# Patient Record
Sex: FEMALE | Race: OTHER | Employment: FULL TIME | URBAN - METROPOLITAN AREA
[De-identification: names, ages, dates, MRNs, and addresses within clinical notes are randomized per-mention and may not be internally consistent; named-entity substitution may affect disease eponyms.]

---

## 2017-04-03 RX ORDER — FLUCONAZOLE 150 MG/1
150 TABLET ORAL ONCE
Qty: 2 TABLET | Refills: 0 | Status: SHIPPED | OUTPATIENT
Start: 2017-04-03 | End: 2017-04-03

## 2017-10-09 ENCOUNTER — OFFICE VISIT (OUTPATIENT)
Dept: OBGYN CLINIC | Age: 42
End: 2017-10-09
Payer: COMMERCIAL

## 2017-10-09 VITALS
HEIGHT: 66 IN | DIASTOLIC BLOOD PRESSURE: 70 MMHG | SYSTOLIC BLOOD PRESSURE: 116 MMHG | WEIGHT: 158 LBS | BODY MASS INDEX: 25.39 KG/M2 | RESPIRATION RATE: 18 BRPM | HEART RATE: 74 BPM

## 2017-10-09 DIAGNOSIS — Z01.419 WELL FEMALE EXAM WITH ROUTINE GYNECOLOGICAL EXAM: Primary | ICD-10-CM

## 2017-10-09 PROCEDURE — 99396 PREV VISIT EST AGE 40-64: CPT | Performed by: ADVANCED PRACTICE MIDWIFE

## 2017-10-09 ASSESSMENT — PATIENT HEALTH QUESTIONNAIRE - PHQ9
2. FEELING DOWN, DEPRESSED OR HOPELESS: 0
SUM OF ALL RESPONSES TO PHQ9 QUESTIONS 1 & 2: 0
1. LITTLE INTEREST OR PLEASURE IN DOING THINGS: 0
SUM OF ALL RESPONSES TO PHQ QUESTIONS 1-9: 0

## 2017-10-09 NOTE — PROGRESS NOTES
History and Physical  [address], [address] [facility], [address], [address] [facility] [phone]   Fax [phone]  Shruthi Dailey  10/9/2017              43 y.o. Chief Complaint   Patient presents with    Annual Exam       Patient's last menstrual period was 2017 (approximate). Primary Care Physician: Andres Walker MD    The patient was seen and examined. She has no chief complaint today and is here for her annual exam.  Her bowels are regular. There are no voiding complaints. She denies any bloating. She denies vaginal discharge and was counseled on STD's and the need for barrier contraception.      HPI : Shruthi Dailey is a 43 y.o. female     Gyn exam no complaints  ________________________________________________________________________  Obstetric History       T1      L1     SAB0   TAB0   Ectopic0   Molar0   Multiple0   Live Births1       # Outcome Date GA Lbr Mansoor/2nd Weight Sex Delivery Anes PTL Lv   1 Term 10/03/14 39w3d  7 lb 3 oz (3.26 kg) F Vag-Spont EPI  OBED        Past Medical History:   Diagnosis Date    Migraine     Narcolepsy                                                                    Past Surgical History:   Procedure Laterality Date    BREAST BIOPSY Left 2016     Family History   Problem Relation Age of Onset    Heart Disease Father      a fib    High Cholesterol Father     Hypertension Father     High Cholesterol Mother     Diabetes Paternal Grandmother     Brain Cancer Maternal Grandmother     Diabetes Paternal Aunt     Liver Cancer Paternal Aunt     Breast Cancer Neg Hx     Cancer Neg Hx     Colon Cancer Neg Hx     Eclampsia Neg Hx     Ovarian Cancer Neg Hx      Labor Neg Hx     Spont Abortions Neg Hx     Stroke Neg Hx      Social History     Social History    Marital status:      Spouse name: N/A    Number of children: N/A    Years of education: N/A     Occupational History    Not on file. Social History Main Topics    Smoking status: Never Smoker    Smokeless tobacco: Never Used    Alcohol use No    Drug use: No    Sexual activity: Yes     Partners: Male     Other Topics Concern    Not on file     Social History Narrative    No narrative on file       MEDICATIONS:  Current Outpatient Prescriptions   Medication Sig Dispense Refill    SUMAtriptan (IMITREX) 50 MG tablet       modafinil (PROVIGIL) 200 MG tablet       sodium oxybate (XYREM) 500 MG/ML SOLN solution Take 4.5 g by mouth 2 times daily.  clotrimazole-betamethasone (LOTRISONE) 1-0.05 % cream Apply topically 2 times daily. 1 Tube 0     No current facility-administered medications for this visit. ALLERGIES:  Allergies as of 10/09/2017    (No Known Allergies)       Symptoms of decreased mood absent    **If either question is answered in a  positive fashion then complete the PHQ9 Scoring Evaluation and make the appropriate referral**      Immunization status: up to date and documented, stated as current, but no records available. Gynecologic History:  Menarche: 15 yo  Menopause at  yo     Patient's last menstrual period was 09/12/2017 (approximate). Sexually Active: Yes    STD History: No     Permanent Sterilization: No   Reversible Birth Control: No        Hormone Replacement Exposure: No      Genetic Qualified Family History of Breast, Ovarian , Colon or Uterine Cancer: See family history     If YES see scanned worksheet.     Preventative Health Testing:    Health Maintenance:  Health Maintenance Due   Topic Date Due    DTaP/Tdap/Td vaccine (1 - Tdap) 05/27/1994    Lipid screen  05/27/2015    Diabetes screen  05/27/2015    Cervical cancer screen  08/24/2016    Flu vaccine (1) 09/01/2017       Date of Last Pap Smear: 2015 neg/neg  Abnormal Pap Smear History: n/a  Colposcopy History:   Date of Last Mammogram: 2016 negative bx with marker placed  Date of Last Colonoscopy:   Date of evaluated and found to be appropriate for the patients age. OMM Structural Component:  The patient did not complain of a Chief complaint requiring OMM. Chief Complaint:none    Structural Exam: No Interest                  ASSESSMENT:      43 y.o. Annual  1. Well female exam with routine gynecological exam  BETTINA DIGITAL SCREEN W OR WO CAD BILATERAL          Chief Complaint   Patient presents with    Annual Exam          Past Medical History:   Diagnosis Date    Migraine     Narcolepsy          Patient Active Problem List    Diagnosis Date Noted    FH: Down syndrome 03/03/2014     Priority: High     FOB has cousins with down syndrome x 2      Narcolepsy      Priority: High     Pt takes xyrem and modafinal  Refer to [facility] for consult and first trimester screen      Migraine      Priority: Medium    Hx of abnormal Pap smear 03/26/2014     Priority: Low       Change 16 weeks U/S at [facility] to anatomy scan at 20 weeks            Hereditary Breast, Ovarian, Colon and Uterine Cancer screening Done. Tobacco & Secondary smoke risks reviewed; instructed on cessation and avoidance      Counseling Completed:  Preventative Health Recommendations and Follow up. The patient was informed of the recommended preventative health recommendations. 1. Annuals every year; Cytology collections per prevailing guidelines. 2. Mammograms begin every year at 37 yo if no abnormalities are found and no family     History. 3. Bone density studies every 2-3 years. Begin at 71 yo. If no fracture history or osteoporosis family history. (significant). 4. Colonoscopy begin at 49 yo. Repeat every ten years if negative and no family history. 5. Calcium of 7769-6009 mg/day in split dosing  6. Vitamin D 400-800 IU/day  7. All other preventative health recommendations will be managed by the patients Primary care physician.              PLAN:  Mammogram ordered  Repeat Annual every 1 year  Cervical Cytology Evaluation begins at 24

## 2017-10-09 NOTE — PROGRESS NOTES
Subjective:      Patient ID: Ulysses Dress is a 43 y.o. female.     HPI    Review of Systems    Objective:   Physical Exam    Assessment:      ***      Plan:      ***

## 2017-11-10 ENCOUNTER — HOSPITAL ENCOUNTER (OUTPATIENT)
Dept: WOMENS IMAGING | Age: 42
Discharge: HOME | End: 2017-11-10
Payer: COMMERCIAL

## 2017-11-10 DIAGNOSIS — Z01.419 WELL FEMALE EXAM WITH ROUTINE GYNECOLOGICAL EXAM: ICD-10-CM

## 2017-11-10 PROCEDURE — 77063 BREAST TOMOSYNTHESIS BI: CPT

## 2018-09-10 ENCOUNTER — HOSPITAL ENCOUNTER (OUTPATIENT)
Age: 43
Discharge: HOME | End: 2018-09-10
Payer: COMMERCIAL

## 2018-09-10 ENCOUNTER — INITIAL PRENATAL (OUTPATIENT)
Dept: OBGYN CLINIC | Age: 43
End: 2018-09-10

## 2018-09-10 ENCOUNTER — HOSPITAL ENCOUNTER (OUTPATIENT)
Age: 43
Setting detail: SPECIMEN
Discharge: HOME | End: 2018-09-10
Payer: COMMERCIAL

## 2018-09-10 VITALS
WEIGHT: 180 LBS | HEART RATE: 82 BPM | SYSTOLIC BLOOD PRESSURE: 114 MMHG | DIASTOLIC BLOOD PRESSURE: 70 MMHG | BODY MASS INDEX: 29.05 KG/M2

## 2018-09-10 DIAGNOSIS — Z34.90 SECOND PREGNANCY: Primary | ICD-10-CM

## 2018-09-10 DIAGNOSIS — Z3A.08 8 WEEKS GESTATION OF PREGNANCY: ICD-10-CM

## 2018-09-10 DIAGNOSIS — Z34.90 SECOND PREGNANCY: ICD-10-CM

## 2018-09-10 DIAGNOSIS — O09.521 ELDERLY MULTIGRAVIDA IN FIRST TRIMESTER: ICD-10-CM

## 2018-09-10 DIAGNOSIS — Z82.79 FH: DOWN SYNDROME: ICD-10-CM

## 2018-09-10 DIAGNOSIS — Z32.01 POSITIVE PREGNANCY TEST: ICD-10-CM

## 2018-09-10 LAB
ABO/RH: NORMAL
ABSOLUTE EOS #: 0.1 K/UL (ref 0–0.4)
ABSOLUTE IMMATURE GRANULOCYTE: ABNORMAL K/UL (ref 0–0.3)
ABSOLUTE LYMPH #: 1.3 K/UL (ref 1–4.8)
ABSOLUTE MONO #: 0.5 K/UL (ref 0.1–1.3)
ANTIBODY SCREEN: NEGATIVE
BASOPHILS # BLD: 1 % (ref 0–2)
BASOPHILS ABSOLUTE: 0 K/UL (ref 0–0.2)
BILIRUBIN URINE: NEGATIVE
BLOOD BANK COMMENT: NORMAL
COLOR: YELLOW
COMMENT UA: NORMAL
CONTROL: ABNORMAL
DIFFERENTIAL TYPE: ABNORMAL
EOSINOPHILS RELATIVE PERCENT: 1 % (ref 0–4)
GLUCOSE BLD-MCNC: 78 MG/DL (ref 70–99)
GLUCOSE URINE: NEGATIVE
HCG QUANTITATIVE: ABNORMAL IU/L
HCT VFR BLD CALC: 41.7 % (ref 36–46)
HEMOGLOBIN: 14 G/DL (ref 12–16)
HEPATITIS B SURFACE ANTIGEN: NONREACTIVE
HIV AG/AB: NONREACTIVE
IMMATURE GRANULOCYTES: ABNORMAL %
KETONES, URINE: NEGATIVE
LEUKOCYTE ESTERASE, URINE: NEGATIVE
LYMPHOCYTES # BLD: 17 % (ref 24–44)
MCH RBC QN AUTO: 31.1 PG (ref 26–34)
MCHC RBC AUTO-ENTMCNC: 33.6 G/DL (ref 31–37)
MCV RBC AUTO: 92.5 FL (ref 80–100)
MONOCYTES # BLD: 7 % (ref 1–7)
NITRITE, URINE: NEGATIVE
NRBC AUTOMATED: ABNORMAL PER 100 WBC
PDW BLD-RTO: 13 % (ref 11.5–14.9)
PH UA: 7 (ref 5–8)
PLATELET # BLD: 254 K/UL (ref 150–450)
PLATELET ESTIMATE: ABNORMAL
PMV BLD AUTO: 8.8 FL (ref 6–12)
PREGNANCY TEST URINE, POC: POSITIVE
PROTEIN UA: NEGATIVE
RBC # BLD: 4.51 M/UL (ref 4–5.2)
RBC # BLD: ABNORMAL 10*6/UL
RUBV IGG SER QL: 61 IU/ML
SEG NEUTROPHILS: 74 % (ref 36–66)
SEGMENTED NEUTROPHILS ABSOLUTE COUNT: 5.8 K/UL (ref 1.3–9.1)
SPECIFIC GRAVITY UA: 1.01 (ref 1–1.03)
T. PALLIDUM, IGG: NONREACTIVE
TSH SERPL DL<=0.05 MIU/L-ACNC: 1.98 MIU/L (ref 0.3–5)
TURBIDITY: CLEAR
URINE HGB: NEGATIVE
UROBILINOGEN, URINE: NORMAL
WBC # BLD: 7.8 K/UL (ref 3.5–11)
WBC # BLD: ABNORMAL 10*3/UL

## 2018-09-10 PROCEDURE — 87491 CHLMYD TRACH DNA AMP PROBE: CPT

## 2018-09-10 PROCEDURE — 84702 CHORIONIC GONADOTROPIN TEST: CPT

## 2018-09-10 PROCEDURE — 86901 BLOOD TYPING SEROLOGIC RH(D): CPT

## 2018-09-10 PROCEDURE — 87070 CULTURE OTHR SPECIMN AEROBIC: CPT

## 2018-09-10 PROCEDURE — 86780 TREPONEMA PALLIDUM: CPT

## 2018-09-10 PROCEDURE — 84443 ASSAY THYROID STIM HORMONE: CPT

## 2018-09-10 PROCEDURE — 82947 ASSAY GLUCOSE BLOOD QUANT: CPT

## 2018-09-10 PROCEDURE — 36415 COLL VENOUS BLD VENIPUNCTURE: CPT

## 2018-09-10 PROCEDURE — 87624 HPV HI-RISK TYP POOLED RSLT: CPT

## 2018-09-10 PROCEDURE — 81003 URINALYSIS AUTO W/O SCOPE: CPT

## 2018-09-10 PROCEDURE — 85025 COMPLETE CBC W/AUTO DIFF WBC: CPT

## 2018-09-10 PROCEDURE — 0500F INITIAL PRENATAL CARE VISIT: CPT | Performed by: ADVANCED PRACTICE MIDWIFE

## 2018-09-10 PROCEDURE — 86900 BLOOD TYPING SEROLOGIC ABO: CPT

## 2018-09-10 PROCEDURE — 87389 HIV-1 AG W/HIV-1&-2 AB AG IA: CPT

## 2018-09-10 PROCEDURE — 87591 N.GONORRHOEAE DNA AMP PROB: CPT

## 2018-09-10 PROCEDURE — 86850 RBC ANTIBODY SCREEN: CPT

## 2018-09-10 PROCEDURE — 86762 RUBELLA ANTIBODY: CPT

## 2018-09-10 PROCEDURE — 87340 HEPATITIS B SURFACE AG IA: CPT

## 2018-09-10 PROCEDURE — G0145 SCR C/V CYTO,THINLAYER,RESCR: HCPCS

## 2018-09-10 NOTE — PROGRESS NOTES
also discussed with morbidity rates in detail. She declined the procedure. Route of delivery and counseling on vaginal, operative vaginal, and  sections were completed with the risks of each to both the patient as well as her baby. The possibility of a blood transfusion was discussed as well. The patient was not opposed to receiving a transfusion if needed. Nuchal translucency/Quad Evaluation and MSAFP single marker testing was reviewed in detail with attention to timing of testing and their windows. For patients beyond the gestational age for Nuchal translucency evaluation Quad testing was recommended. Timing for the Quad test was reviewed. Benefits of the above testing was reviewed. A second trimester amniocentesis was also made available to the patient. Risks, Benefits and non-invasive alternative testing was reviewed. The literature regarding a questionable link to pitocin augmentation and induction of labor, the assistance of labor contractions and the initiation of contractions to help delivery, have been reviewed with the patient regarding the increased potential of having a  with Attention Deficit Hyperactivity Disorder and or Autism. These two disorders and the ramifications of their impact on a child and the family caring for that child has been reviewed with the patient in detail. She was given the risks, benefits and alternatives of the use of this medication. She has agreed to its use in the delivery of her unborn child if needed at the time of delivery, Yes. The patient was questioned in detail regarding any genetic misnomer history, chromosomal abnormalities, or learning disabilities in  herself, the father of the baby or their families. SHE DENIED ANY HISTORY AS STATED ABOVE: Yes    Upon completion of the visit all questions were answered and the patients follow-up and testing schedule were reviewed. Prenatal vitamins were given.     T-dap Vaccine recommendations reviewed with the patient. Patient notified of timing of vaccination 27-36 weeks gestation. Patient aware Vaccine is NOT Live. Yes.

## 2018-09-11 LAB
C TRACH DNA GENITAL QL NAA+PROBE: NEGATIVE
HPV SAMPLE: NORMAL
HPV SOURCE: NORMAL
HPV, GENOTYPE 16: NOT DETECTED
HPV, GENOTYPE 18: NOT DETECTED
HPV, HIGH RISK OTHER: NOT DETECTED
HPV, INTERPRETATION: NORMAL
N. GONORRHOEAE DNA: NEGATIVE

## 2018-09-13 LAB
CULTURE: NORMAL
CULTURE: NORMAL
Lab: NORMAL
SPECIMEN DESCRIPTION: NORMAL
STATUS: NORMAL

## 2018-09-25 LAB — CYTOLOGY REPORT: NORMAL

## 2018-10-04 ENCOUNTER — ROUTINE PRENATAL (OUTPATIENT)
Dept: PERINATAL CARE | Age: 43
End: 2018-10-04
Payer: COMMERCIAL

## 2018-10-04 VITALS
RESPIRATION RATE: 20 BRPM | SYSTOLIC BLOOD PRESSURE: 110 MMHG | TEMPERATURE: 98 F | HEART RATE: 80 BPM | WEIGHT: 188 LBS | DIASTOLIC BLOOD PRESSURE: 70 MMHG | BODY MASS INDEX: 30.22 KG/M2 | HEIGHT: 66 IN

## 2018-10-04 DIAGNOSIS — O09.521 ELDERLY MULTIGRAVIDA IN FIRST TRIMESTER: ICD-10-CM

## 2018-10-04 DIAGNOSIS — Z36.9 FIRST TRIMESTER SCREENING: Primary | ICD-10-CM

## 2018-10-04 DIAGNOSIS — Z3A.13 13 WEEKS GESTATION OF PREGNANCY: ICD-10-CM

## 2018-10-04 DIAGNOSIS — O36.80X0 ENCOUNTER TO DETERMINE FETAL VIABILITY OF PREGNANCY, SINGLE OR UNSPECIFIED FETUS: ICD-10-CM

## 2018-10-04 PROCEDURE — 76813 OB US NUCHAL MEAS 1 GEST: CPT | Performed by: OBSTETRICS & GYNECOLOGY

## 2018-10-04 PROCEDURE — 76801 OB US < 14 WKS SINGLE FETUS: CPT | Performed by: OBSTETRICS & GYNECOLOGY

## 2018-10-09 ENCOUNTER — ROUTINE PRENATAL (OUTPATIENT)
Dept: OBGYN CLINIC | Age: 43
End: 2018-10-09

## 2018-10-09 VITALS
SYSTOLIC BLOOD PRESSURE: 114 MMHG | HEART RATE: 84 BPM | WEIGHT: 191 LBS | BODY MASS INDEX: 30.83 KG/M2 | DIASTOLIC BLOOD PRESSURE: 76 MMHG

## 2018-10-09 DIAGNOSIS — Z82.79 FH: DOWN SYNDROME: ICD-10-CM

## 2018-10-09 DIAGNOSIS — O09.521 ELDERLY MULTIGRAVIDA IN FIRST TRIMESTER: ICD-10-CM

## 2018-10-09 DIAGNOSIS — Z3A.14 14 WEEKS GESTATION OF PREGNANCY: Primary | ICD-10-CM

## 2018-10-09 PROCEDURE — 0502F SUBSEQUENT PRENATAL CARE: CPT | Performed by: ADVANCED PRACTICE MIDWIFE

## 2018-10-09 NOTE — PROGRESS NOTES
Amador Hernandez is a 37 y.o. female 14w0d        OB History    Para Term  AB Living   2 1 1     1   SAB TAB Ectopic Molar Multiple Live Births             1      # Outcome Date GA Lbr Mansoor/2nd Weight Sex Delivery Anes PTL Lv   2 Current            1 Term 10/03/14 39w3d  7 lb 3 oz (3.26 kg) F Vag-Spont EPI  OBED                Vitals  BP: 114/76  Weight: 191 lb (86.6 kg)  Pulse: 84  Patient Position: Sitting  Albumin: Negative  Glucose: Negative      The patient was seen and evaluated. There was Positive fetal movements. No contractions or leakage of fluid. Signs and symptoms of  labor as well as labor were reviewed. The Nuchal Translucency testing was reviewed and found to be U/S normal, labs not resulted yet. . A single marker MSAFP will be ordered for a 15-20 week gestational age window. TOP ST OH Reviewed. Dates were reviewed with the patient. An 18-22 week anatomy ultrasound has been ordered. The patient will return to the office for her next visit in 4 weeks. See antepartum flow sheet. No Patient Care Coordination Note on file. Assessment:  1. Amador Hernandez is a 37 y.o. female  2.   3. 14w0d    Patient Active Problem List    Diagnosis Date Noted    Elderly multigravida in first trimester 2014     Priority: High     FOB 55 with family hx of cousins x 2 with down syndrome   Refer to [facility] for consult and first trimester screen  10/4/2018 Declined invasive prenatal testing, considering NIPT testing through [facility]    Advanced Maternal Age Counseling    If a woman is over the age of 28, she is considered to be of Advanced Maternal Age, and with this title comes risks for mom and baby.    Increased risk of Down Syndrome - the most common chromosomal birth defect (chromosomes - cells that carry genes and transmit heredity information)   Increased risk of miscarriage   20% increase at ages 28 to 44   35% increase at ages 42-38   Over 50% increase by age 45  Increased risk of  Section () for delivery method   Gestational diabetes - diabetes that develops for the first time during pregnancy. Women who have this could possibly have a very large baby who then is at risk for injuries during delivery. Pregnancy Induced Hypertension - High blood pressure   Placental Problems - one of the most common placental problems is placenta previa in which the placenta covers all or part of the uterine opening (cervix). This can cause severe bleeding during delivery and can make  delivery necessary. Even with all of these increased risks, with the advancement in medical procedures and testing, there are several ways to reduce your risk. They include early and regular prenatal care, taking the multivitamin with folic acid prescribed by your health care manager, beginning pregnancy at a healthy weight, not smoking or drinking alcoholic beverages, and eating healthy foods. There are tests that all pregnant women are encouraged to take, but there is additional prenatal testing that is regularly offered to any woman 28 or older because of the potential of increased risks to the mother and baby. These tests are chorionic villus sampling (CVS) and amniocentesis. [employer] is also available which is a non-invasive option for fetal karyotyping. With CVS, a small sample of cells (called chorionic villi) is taken from the placenta where it attached to the wall of the uterus. Chorionic villi are tiny parts of the placenta; therefore they have the same genes as the baby. This test is 98% accurate, but can carry a slightly higher risk of miscarriage than amniocentesis, since the procedure is done in early pregnancy. Amniocentesis is a procedure where a sample of fluid is removed from the amniotic sac for analysis and evaluation. During this procedure, fluid is removed by placing a long needle through the abdominal wall into the amniotic sac.  The amniocentesis needle is typically guided into the sac with the help of ultrasound imaging. Once the needle is in the sac, a syringe is used to withdraw the clear taryn-colored amniotic fluid, which looks a bit like urine. NIPT, utilizes the maternal blood to test for fetal cells. These fetal cells are then karyotyped for genetic evaluation. All of these tests, CVS, NIPT and amniocentesis, let couples know if the fetus will have genetic abnormalities, and will help them make informed decisions regarding their pregnancy. Karyotyping by either CVS, NIPT or Amniocentesis is the ONLY way to confirm the genetic chromosomal structure regarding trisomy; Down's Syndrome, Edward's or Pateau's. [facility] was reviewed. If NIPT is positive then a confirmatory amniocentesis would be recommended to confirm the diagnosis.  FH: Down syndrome 03/03/2014     Priority: High     FOB has cousins with down syndrome x 2      Narcolepsy      Priority: High     Pt takes xyrem and modafinal  Refer to [facility] for consult and first trimester screen      Migraine      Priority: Medium    Hx of abnormal Pap smear 03/26/2014     Priority: Low       Change 16 weeks U/S at [facility] to anatomy scan at 20 weeks          Diagnosis Orders   1. 14 weeks gestation of pregnancy     2. FH: Down syndrome     3.  Elderly multigravida in first trimester           Plan:

## 2018-10-23 ENCOUNTER — ROUTINE PRENATAL (OUTPATIENT)
Dept: PERINATAL CARE | Age: 43
End: 2018-10-23
Payer: COMMERCIAL

## 2018-10-23 VITALS
TEMPERATURE: 97.8 F | HEART RATE: 80 BPM | DIASTOLIC BLOOD PRESSURE: 60 MMHG | RESPIRATION RATE: 16 BRPM | BODY MASS INDEX: 30.53 KG/M2 | HEIGHT: 66 IN | WEIGHT: 190 LBS | SYSTOLIC BLOOD PRESSURE: 108 MMHG

## 2018-10-23 DIAGNOSIS — O09.522 ELDERLY MULTIGRAVIDA IN SECOND TRIMESTER: Primary | ICD-10-CM

## 2018-10-23 DIAGNOSIS — Z3A.16 16 WEEKS GESTATION OF PREGNANCY: ICD-10-CM

## 2018-10-23 DIAGNOSIS — O35.2XX0 HEREDITARY FAMILIAL DISEASE AFFECTING MANAGEMENT OF MOTHER AND POSSIBLY AFFECTING FETUS, ANTEPARTUM, SINGLE OR UNSPECIFIED FETUS: ICD-10-CM

## 2018-10-23 DIAGNOSIS — Z86.69 H/O MIGRAINE DURING PREGNANCY: ICD-10-CM

## 2018-10-23 DIAGNOSIS — Z87.59 H/O MIGRAINE DURING PREGNANCY: ICD-10-CM

## 2018-10-23 PROCEDURE — 99242 OFF/OP CONSLTJ NEW/EST SF 20: CPT | Performed by: OBSTETRICS & GYNECOLOGY

## 2018-11-06 ENCOUNTER — ROUTINE PRENATAL (OUTPATIENT)
Dept: OBGYN CLINIC | Age: 43
End: 2018-11-06

## 2018-11-06 ENCOUNTER — HOSPITAL ENCOUNTER (OUTPATIENT)
Age: 43
Discharge: HOME | End: 2018-11-06
Payer: COMMERCIAL

## 2018-11-06 VITALS
BODY MASS INDEX: 32.44 KG/M2 | DIASTOLIC BLOOD PRESSURE: 72 MMHG | HEART RATE: 76 BPM | SYSTOLIC BLOOD PRESSURE: 110 MMHG | WEIGHT: 201 LBS

## 2018-11-06 DIAGNOSIS — O09.521 ELDERLY MULTIGRAVIDA IN FIRST TRIMESTER: ICD-10-CM

## 2018-11-06 DIAGNOSIS — Z87.59 H/O MIGRAINE DURING PREGNANCY: ICD-10-CM

## 2018-11-06 DIAGNOSIS — O09.92 HIGH-RISK PREGNANCY IN SECOND TRIMESTER: ICD-10-CM

## 2018-11-06 DIAGNOSIS — O09.522 ELDERLY MULTIGRAVIDA IN SECOND TRIMESTER: ICD-10-CM

## 2018-11-06 DIAGNOSIS — O35.2XX0 HEREDITARY FAMILIAL DISEASE AFFECTING MANAGEMENT OF MOTHER AND POSSIBLY AFFECTING FETUS, ANTEPARTUM, SINGLE OR UNSPECIFIED FETUS: ICD-10-CM

## 2018-11-06 DIAGNOSIS — Z3A.18 18 WEEKS GESTATION OF PREGNANCY: ICD-10-CM

## 2018-11-06 DIAGNOSIS — Z86.69 H/O MIGRAINE DURING PREGNANCY: ICD-10-CM

## 2018-11-06 DIAGNOSIS — O09.92 HIGH-RISK PREGNANCY IN SECOND TRIMESTER: Primary | ICD-10-CM

## 2018-11-06 DIAGNOSIS — Z82.79 FH: DOWN SYNDROME: ICD-10-CM

## 2018-11-06 LAB
TOXOPLASM IGM: 0.45 INDEX
TOXOPLASMA BLOOD FOR RATIO: <0.5 IU/ML

## 2018-11-06 PROCEDURE — 86778 TOXOPLASMA ANTIBODY IGM: CPT

## 2018-11-06 PROCEDURE — 36415 COLL VENOUS BLD VENIPUNCTURE: CPT

## 2018-11-06 PROCEDURE — 0502F SUBSEQUENT PRENATAL CARE: CPT | Performed by: OBSTETRICS & GYNECOLOGY

## 2018-11-06 PROCEDURE — 82105 ALPHA-FETOPROTEIN SERUM: CPT

## 2018-11-06 PROCEDURE — 86777 TOXOPLASMA ANTIBODY: CPT

## 2018-11-06 NOTE — PROGRESS NOTES
patient was counseled on the risks of tobacco abuse. Both maternal and fetal. She was instructed to stop smoking if currently using tobacco. Morbidity, mortality, and cessation programs were reviewed. The risks include but are not limited to increased risks of  labor,  delivery, premature rupture of membranes, intrauterine growth restriction, intrauterine fetal demise and abruptio placenta. Secondary smoke risks were also reviewed. Increases in cancer, respiratory problems, and sudden infant death syndrome were reviewed as well. The patient was informed of a 2-4% risk of congenital anomalies in the general population. She was also informed that karyotyping is the only way to evaluate the fetus for genetic problems and genetic lethal anomalies. Chorionic villous sampling, amniocentesis and Maternal Genetic Blood Sampling-(NIPT Testing) were also discussed with morbidity rates in detail. She declined any of the options. Route of delivery and counseling on vaginal, operative vaginal, and  sections were completed with the risks of each to both the patient as well as her baby. The possibility of a blood transfusion was discussed as well. The patient was not opposed to receiving a transfusion if needed. Nuchal translucency and MSAFP single marker testing was reviewed in detail with attention to timing of testing and their windows. For patients beyond the gestational age for Nuchal translucency evaluation Quad testing was recommended. Timing for the Quad test was reviewed. Benefits of the above testing was reviewed. A second trimester amniocentesis was also made available to the patient. Risks, Benefits and non-invasive alternative testing was reviewed.      The literature regarding a questionable link to pitocin augmentation and induction of labor, the assistance of labor contractions and the initiation of contractions to help delivery, have been reviewed with the patient regarding the increased potential of having a  with Attention Deficit Hyperactivity Disorder and or Autism. These two disorders and the ramifications of their impact on a child and the family caring for that child has been reviewed with the patient in detail. She was given the risks, benefits and alternatives of the use of this medication. She has agreed to its use in the delivery of her unborn child if needed at the time of delivery, Yes. The patient was questioned in detail regarding any genetic misnomer history, chromosomal abnormalities, or learning disabilities in  herself, the father of the baby or their families. SHE DENIED ANY HISTORY AS STATED ABOVE: Yes    Upon completion of the visit all questions were answered and the patients follow-up and testing schedule were reviewed. Prenatal vitamins were given.

## 2018-11-08 LAB
AFP INTERPRETATION: NORMAL
AFP MOM: 1.14
AFP SPECIMEN: NORMAL
AFP: 40 NG/ML
DATE OF BIRTH: NORMAL
DATING METHOD: NORMAL
DETERMINED BY: NORMAL
DIABETIC: NO
DUE DATE: NORMAL
ESTIMATED DUE DATE: NORMAL
FAMILY HISTORY NTD: NO
GESTATIONAL AGE: NORMAL
INSULIN REQ DIABETES: NO
LAST MENSTRUAL PERIOD: NORMAL
MATERNAL AGE AT EDD: 43.9 YR
MATERNAL WEIGHT: 200
MONOCHORIONIC TWINS: NO
NUMBER OF FETUSES: NORMAL
PATIENT WEIGHT UNITS: NORMAL
PATIENT WEIGHT: NORMAL
RACE (MATERNAL): NORMAL
RACE: NORMAL
REPEAT SPECIMEN?: NO
SMOKING: NO
SMOKING: NO
VALPROIC/CARBAMAZEP: NO
ZZ NTE CLEAN UP: HISTORY: NO

## 2018-11-27 ENCOUNTER — ROUTINE PRENATAL (OUTPATIENT)
Dept: PERINATAL CARE | Age: 43
End: 2018-11-27
Payer: COMMERCIAL

## 2018-11-27 VITALS
WEIGHT: 206 LBS | SYSTOLIC BLOOD PRESSURE: 122 MMHG | HEART RATE: 87 BPM | DIASTOLIC BLOOD PRESSURE: 64 MMHG | BODY MASS INDEX: 33.11 KG/M2 | RESPIRATION RATE: 16 BRPM | HEIGHT: 66 IN | TEMPERATURE: 97.9 F

## 2018-11-27 DIAGNOSIS — Z36.86 ENCOUNTER FOR SCREENING FOR RISK OF PRE-TERM LABOR: ICD-10-CM

## 2018-11-27 DIAGNOSIS — O44.00 PLACENTA PREVIA WITHOUT HEMORRHAGE, ANTEPARTUM: ICD-10-CM

## 2018-11-27 DIAGNOSIS — O09.522 ELDERLY MULTIGRAVIDA IN SECOND TRIMESTER: Primary | ICD-10-CM

## 2018-11-27 DIAGNOSIS — O35.03X0 CHOROID PLEXUS CYST OF FETUS AFFECTING CARE OF MOTHER, ANTEPARTUM, SINGLE OR UNSPECIFIED FETUS: ICD-10-CM

## 2018-11-27 DIAGNOSIS — Z3A.21 21 WEEKS GESTATION OF PREGNANCY: ICD-10-CM

## 2018-11-27 DIAGNOSIS — G93.0 CHOROID PLEXUS CYST: Primary | ICD-10-CM

## 2018-11-27 PROCEDURE — 76811 OB US DETAILED SNGL FETUS: CPT | Performed by: OBSTETRICS & GYNECOLOGY

## 2018-11-27 PROCEDURE — 76817 TRANSVAGINAL US OBSTETRIC: CPT | Performed by: OBSTETRICS & GYNECOLOGY

## 2018-11-27 PROCEDURE — 99213 OFFICE O/P EST LOW 20 MIN: CPT | Performed by: OBSTETRICS & GYNECOLOGY

## 2018-12-04 ENCOUNTER — TELEPHONE (OUTPATIENT)
Dept: OBGYN CLINIC | Age: 43
End: 2018-12-04

## 2018-12-11 ENCOUNTER — ROUTINE PRENATAL (OUTPATIENT)
Dept: OBGYN CLINIC | Age: 43
End: 2018-12-11

## 2018-12-11 VITALS
BODY MASS INDEX: 33.57 KG/M2 | DIASTOLIC BLOOD PRESSURE: 74 MMHG | WEIGHT: 208 LBS | HEART RATE: 78 BPM | SYSTOLIC BLOOD PRESSURE: 110 MMHG

## 2018-12-11 DIAGNOSIS — O35.03X0 CHOROID PLEXUS CYST OF FETUS AFFECTING CARE OF MOTHER, ANTEPARTUM, SINGLE OR UNSPECIFIED FETUS: ICD-10-CM

## 2018-12-11 DIAGNOSIS — Z86.69 H/O MIGRAINE DURING PREGNANCY: ICD-10-CM

## 2018-12-11 DIAGNOSIS — Z3A.23 23 WEEKS GESTATION OF PREGNANCY: ICD-10-CM

## 2018-12-11 DIAGNOSIS — O35.2XX0 HEREDITARY FAMILIAL DISEASE AFFECTING MANAGEMENT OF MOTHER AND POSSIBLY AFFECTING FETUS, ANTEPARTUM, SINGLE OR UNSPECIFIED FETUS: ICD-10-CM

## 2018-12-11 DIAGNOSIS — Z82.79 FH: DOWN SYNDROME: ICD-10-CM

## 2018-12-11 DIAGNOSIS — O09.92 HIGH-RISK PREGNANCY IN SECOND TRIMESTER: Primary | ICD-10-CM

## 2018-12-11 DIAGNOSIS — O09.521 ELDERLY MULTIGRAVIDA IN FIRST TRIMESTER: ICD-10-CM

## 2018-12-11 DIAGNOSIS — Z87.59 H/O MIGRAINE DURING PREGNANCY: ICD-10-CM

## 2018-12-11 DIAGNOSIS — O44.00 PLACENTA PREVIA WITHOUT HEMORRHAGE, ANTEPARTUM: ICD-10-CM

## 2018-12-11 DIAGNOSIS — O09.522 ELDERLY MULTIGRAVIDA IN SECOND TRIMESTER: ICD-10-CM

## 2018-12-11 PROCEDURE — 0502F SUBSEQUENT PRENATAL CARE: CPT | Performed by: NURSE PRACTITIONER

## 2018-12-11 NOTE — PROGRESS NOTES
Mansoor Carver is a 37 y.o. female 23w0d        OB History    Para Term  AB Living   2 1 1     1   SAB TAB Ectopic Molar Multiple Live Births             1      # Outcome Date GA Lbr Mansoor/2nd Weight Sex Delivery Anes PTL Lv   2 Current            1 Term 10/03/14 39w3d  7 lb 3 oz (3.26 kg) F Vag-Spont EPI  OBED          Vitals  BP: 110/74  Weight: 208 lb (94.3 kg)  Pulse: 78  Patient Position: Sitting  Albumin: Negative  Glucose: Negative    The patient was seen and evaluated. There was positive fetal movements. No contractions or leakage of fluid. Signs and symptoms of  labor as well as labor were reviewed. The Nuchal Translucency testing was reviewed and found to be normal A single marker MSAFP was reviewed and found to be normal. The patients anatomy ultrasound has been completed and reviewed with patient. TOP ST OH Reviewed. A 28 week lab panel was ordered. This includes a (HH, 1 hr GTT, U/A C&S). The patient is to complete this in the next two to four weeks. The S/S of Pre-Eclampsia were reviewed with the patient in detail. She is to report any of these if they occur. She currently denies any of these. The patient is RH positive Rhogam Ordered no    The patient was instructed on fetal kick counts and was given a kick sheet to complete every 8 hours. This is to begin at 28 weeks gestation. She was instructed that the baby should move at a minimum of ten times within one hour after a meal. The patient was instructed to lay down on her left side twenty minutes after eating and count movements for up to one hour with a target value of ten movements. She was instructed to notify the office if she did not make that target after two attempts or if after any attempt there was less than four movements.       18 Pt declined Flu Vacc  10/26/2018 Daily oral baby ASA 81mg PO based on the guidelines of the USPSTF and ACOG for the prevention of preeclampsia in women considered high make  delivery necessary. Even with all of these increased risks, with the advancement in medical procedures and testing, there are several ways to reduce your risk. They include early and regular prenatal care, taking the multivitamin with folic acid prescribed by your health care manager, beginning pregnancy at a healthy weight, not smoking or drinking alcoholic beverages, and eating healthy foods. There are tests that all pregnant women are encouraged to take, but there is additional prenatal testing that is regularly offered to any woman 28 or older because of the potential of increased risks to the mother and baby. These tests are chorionic villus sampling (CVS) and amniocentesis. [employer] is also available which is a non-invasive option for fetal karyotyping. With CVS, a small sample of cells (called chorionic villi) is taken from the placenta where it attached to the wall of the uterus. Chorionic villi are tiny parts of the placenta; therefore they have the same genes as the baby. This test is 98% accurate, but can carry a slightly higher risk of miscarriage than amniocentesis, since the procedure is done in early pregnancy. Amniocentesis is a procedure where a sample of fluid is removed from the amniotic sac for analysis and evaluation. During this procedure, fluid is removed by placing a long needle through the abdominal wall into the amniotic sac. The amniocentesis needle is typically guided into the sac with the help of ultrasound imaging. Once the needle is in the sac, a syringe is used to withdraw the clear taryn-colored amniotic fluid, which looks a bit like urine. NIPT, utilizes the maternal blood to test for fetal cells. These fetal cells are then karyotyped for genetic evaluation. All of these tests, CVS, NIPT and amniocentesis, let couples know if the fetus will have genetic abnormalities, and will help them make informed decisions regarding their pregnancy.  Karyotyping by either

## 2019-01-08 ENCOUNTER — ROUTINE PRENATAL (OUTPATIENT)
Dept: OBGYN CLINIC | Age: 44
End: 2019-01-08

## 2019-01-08 VITALS
SYSTOLIC BLOOD PRESSURE: 108 MMHG | DIASTOLIC BLOOD PRESSURE: 70 MMHG | BODY MASS INDEX: 34.06 KG/M2 | HEART RATE: 68 BPM | WEIGHT: 211 LBS

## 2019-01-08 DIAGNOSIS — O09.92 HIGH-RISK PREGNANCY IN SECOND TRIMESTER: Primary | ICD-10-CM

## 2019-01-08 DIAGNOSIS — Z86.69 H/O MIGRAINE DURING PREGNANCY: ICD-10-CM

## 2019-01-08 DIAGNOSIS — O09.521 ELDERLY MULTIGRAVIDA IN FIRST TRIMESTER: ICD-10-CM

## 2019-01-08 DIAGNOSIS — Z82.79 FH: DOWN SYNDROME: ICD-10-CM

## 2019-01-08 DIAGNOSIS — Z87.59 H/O MIGRAINE DURING PREGNANCY: ICD-10-CM

## 2019-01-08 DIAGNOSIS — O35.2XX0 HEREDITARY FAMILIAL DISEASE AFFECTING MANAGEMENT OF MOTHER AND POSSIBLY AFFECTING FETUS, ANTEPARTUM, SINGLE OR UNSPECIFIED FETUS: ICD-10-CM

## 2019-01-08 DIAGNOSIS — O35.03X0 CHOROID PLEXUS CYST OF FETUS AFFECTING CARE OF MOTHER, ANTEPARTUM, SINGLE OR UNSPECIFIED FETUS: ICD-10-CM

## 2019-01-08 DIAGNOSIS — O44.00 PLACENTA PREVIA WITHOUT HEMORRHAGE, ANTEPARTUM: ICD-10-CM

## 2019-01-08 DIAGNOSIS — Z3A.27 27 WEEKS GESTATION OF PREGNANCY: ICD-10-CM

## 2019-01-08 PROCEDURE — 0502F SUBSEQUENT PRENATAL CARE: CPT | Performed by: OBSTETRICS & GYNECOLOGY

## 2019-01-15 ENCOUNTER — HOSPITAL ENCOUNTER (OUTPATIENT)
Age: 44
Discharge: HOME | End: 2019-01-15
Payer: COMMERCIAL

## 2019-01-15 ENCOUNTER — TELEPHONE (OUTPATIENT)
Dept: OBGYN CLINIC | Age: 44
End: 2019-01-15

## 2019-01-15 DIAGNOSIS — O99.810 ABNORMAL GLUCOSE TOLERANCE TEST (GTT) DURING PREGNANCY, ANTEPARTUM: Primary | ICD-10-CM

## 2019-01-15 DIAGNOSIS — O35.2XX0 HEREDITARY FAMILIAL DISEASE AFFECTING MANAGEMENT OF MOTHER AND POSSIBLY AFFECTING FETUS, ANTEPARTUM, SINGLE OR UNSPECIFIED FETUS: ICD-10-CM

## 2019-01-15 DIAGNOSIS — O09.92 HIGH-RISK PREGNANCY IN SECOND TRIMESTER: ICD-10-CM

## 2019-01-15 DIAGNOSIS — Z3A.27 27 WEEKS GESTATION OF PREGNANCY: ICD-10-CM

## 2019-01-15 LAB
ABSOLUTE EOS #: 0.1 K/UL (ref 0–0.4)
ABSOLUTE IMMATURE GRANULOCYTE: ABNORMAL K/UL (ref 0–0.3)
ABSOLUTE LYMPH #: 1.1 K/UL (ref 1–4.8)
ABSOLUTE MONO #: 0.3 K/UL (ref 0.1–1.3)
BASOPHILS # BLD: 0 % (ref 0–2)
BASOPHILS ABSOLUTE: 0 K/UL (ref 0–0.2)
BILIRUBIN URINE: NEGATIVE
COLOR: YELLOW
COMMENT UA: NORMAL
DIFFERENTIAL TYPE: ABNORMAL
EOSINOPHILS RELATIVE PERCENT: 1 % (ref 0–4)
GLUCOSE ADMINISTRATION: ABNORMAL
GLUCOSE TOLERANCE SCREEN 50G: 149 MG/DL (ref 70–135)
GLUCOSE URINE: NEGATIVE
HCT VFR BLD CALC: 39.1 % (ref 36–46)
HEMOGLOBIN: 13 G/DL (ref 12–16)
IMMATURE GRANULOCYTES: ABNORMAL %
KETONES, URINE: NEGATIVE
LEUKOCYTE ESTERASE, URINE: NEGATIVE
LYMPHOCYTES # BLD: 13 % (ref 24–44)
MCH RBC QN AUTO: 30.6 PG (ref 26–34)
MCHC RBC AUTO-ENTMCNC: 33.2 G/DL (ref 31–37)
MCV RBC AUTO: 92.3 FL (ref 80–100)
MONOCYTES # BLD: 4 % (ref 1–7)
NITRITE, URINE: NEGATIVE
NRBC AUTOMATED: ABNORMAL PER 100 WBC
PDW BLD-RTO: 13.5 % (ref 11.5–14.9)
PH UA: 8 (ref 5–8)
PLATELET # BLD: 199 K/UL (ref 150–450)
PLATELET ESTIMATE: ABNORMAL
PMV BLD AUTO: 8.7 FL (ref 6–12)
PROTEIN UA: NEGATIVE
RBC # BLD: 4.23 M/UL (ref 4–5.2)
RBC # BLD: ABNORMAL 10*6/UL
SEG NEUTROPHILS: 82 % (ref 36–66)
SEGMENTED NEUTROPHILS ABSOLUTE COUNT: 6.5 K/UL (ref 1.3–9.1)
SPECIFIC GRAVITY UA: 1 (ref 1–1.03)
TURBIDITY: CLEAR
URINE HGB: NEGATIVE
UROBILINOGEN, URINE: NORMAL
WBC # BLD: 8.1 K/UL (ref 3.5–11)
WBC # BLD: ABNORMAL 10*3/UL

## 2019-01-15 PROCEDURE — 82950 GLUCOSE TEST: CPT

## 2019-01-15 PROCEDURE — 81003 URINALYSIS AUTO W/O SCOPE: CPT

## 2019-01-15 PROCEDURE — 85025 COMPLETE CBC W/AUTO DIFF WBC: CPT

## 2019-01-15 PROCEDURE — 36415 COLL VENOUS BLD VENIPUNCTURE: CPT

## 2019-01-22 ENCOUNTER — ROUTINE PRENATAL (OUTPATIENT)
Dept: PERINATAL CARE | Age: 44
End: 2019-01-22
Payer: COMMERCIAL

## 2019-01-22 VITALS
BODY MASS INDEX: 34.39 KG/M2 | DIASTOLIC BLOOD PRESSURE: 68 MMHG | HEART RATE: 72 BPM | RESPIRATION RATE: 16 BRPM | TEMPERATURE: 97.4 F | WEIGHT: 214 LBS | HEIGHT: 66 IN | SYSTOLIC BLOOD PRESSURE: 114 MMHG

## 2019-01-22 DIAGNOSIS — O44.00 PLACENTA PREVIA WITHOUT HEMORRHAGE, ANTEPARTUM: Primary | ICD-10-CM

## 2019-01-22 DIAGNOSIS — Z13.89 ENCOUNTER FOR ROUTINE SCREENING FOR MALFORMATION USING ULTRASONICS: ICD-10-CM

## 2019-01-22 DIAGNOSIS — O35.03X0 CHOROID PLEXUS CYST OF FETUS AFFECTING CARE OF MOTHER, ANTEPARTUM, SINGLE OR UNSPECIFIED FETUS: ICD-10-CM

## 2019-01-22 DIAGNOSIS — Z03.72 SUSPECTED PLACENTAL PROBLEM NOT FOUND: ICD-10-CM

## 2019-01-22 DIAGNOSIS — Z3A.29 29 WEEKS GESTATION OF PREGNANCY: ICD-10-CM

## 2019-01-22 DIAGNOSIS — Z03.73 SUSPECTED FETAL ANOMALY NOT FOUND: ICD-10-CM

## 2019-01-22 DIAGNOSIS — O99.810 ABNORMAL GLUCOSE TOLERANCE TEST (GTT) DURING PREGNANCY, ANTEPARTUM: ICD-10-CM

## 2019-01-22 DIAGNOSIS — O09.523 ELDERLY MULTIGRAVIDA IN THIRD TRIMESTER: ICD-10-CM

## 2019-01-22 PROCEDURE — 76805 OB US >/= 14 WKS SNGL FETUS: CPT | Performed by: OBSTETRICS & GYNECOLOGY

## 2019-01-22 PROCEDURE — 76819 FETAL BIOPHYS PROFIL W/O NST: CPT | Performed by: OBSTETRICS & GYNECOLOGY

## 2019-01-22 PROCEDURE — 76817 TRANSVAGINAL US OBSTETRIC: CPT | Performed by: OBSTETRICS & GYNECOLOGY

## 2019-01-26 ENCOUNTER — HOSPITAL ENCOUNTER (OUTPATIENT)
Age: 44
Discharge: HOME | End: 2019-01-26
Payer: COMMERCIAL

## 2019-01-26 DIAGNOSIS — O99.810 ABNORMAL GLUCOSE TOLERANCE TEST (GTT) DURING PREGNANCY, ANTEPARTUM: ICD-10-CM

## 2019-01-26 LAB
AMOUNT GLUCOSE GIVEN: 100 G
GLUCOSE FASTING: 86 MG/DL (ref 65–99)
GLUCOSE TOLERANCE TEST 1 HOUR: 169 MG/DL (ref 65–184)
GLUCOSE TOLERANCE TEST 2 HOUR: 113 MG/DL (ref 65–139)
GLUCOSE TOLERANCE TEST 3 HOUR: 60 MG/DL (ref 65–130)

## 2019-01-26 PROCEDURE — 36415 COLL VENOUS BLD VENIPUNCTURE: CPT

## 2019-01-26 PROCEDURE — 82951 GLUCOSE TOLERANCE TEST (GTT): CPT

## 2019-01-26 PROCEDURE — 83036 HEMOGLOBIN GLYCOSYLATED A1C: CPT

## 2019-01-26 PROCEDURE — 82952 GTT-ADDED SAMPLES: CPT

## 2019-01-27 LAB
ESTIMATED AVERAGE GLUCOSE: 91 MG/DL
HBA1C MFR BLD: 4.8 % (ref 4–6)

## 2019-01-31 ENCOUNTER — ROUTINE PRENATAL (OUTPATIENT)
Dept: OBGYN CLINIC | Age: 44
End: 2019-01-31

## 2019-01-31 VITALS
SYSTOLIC BLOOD PRESSURE: 112 MMHG | DIASTOLIC BLOOD PRESSURE: 78 MMHG | HEART RATE: 74 BPM | WEIGHT: 217 LBS | BODY MASS INDEX: 35.02 KG/M2

## 2019-01-31 DIAGNOSIS — O99.810 ABNORMAL GLUCOSE TOLERANCE TEST (GTT) DURING PREGNANCY, ANTEPARTUM: ICD-10-CM

## 2019-01-31 DIAGNOSIS — Z86.69 H/O MIGRAINE DURING PREGNANCY: ICD-10-CM

## 2019-01-31 DIAGNOSIS — O09.521 ELDERLY MULTIGRAVIDA IN FIRST TRIMESTER: ICD-10-CM

## 2019-01-31 DIAGNOSIS — Z82.79 FH: DOWN SYNDROME: ICD-10-CM

## 2019-01-31 DIAGNOSIS — O35.03X0 CHOROID PLEXUS CYST OF FETUS AFFECTING CARE OF MOTHER, ANTEPARTUM, SINGLE OR UNSPECIFIED FETUS: ICD-10-CM

## 2019-01-31 DIAGNOSIS — O44.00 PLACENTA PREVIA WITHOUT HEMORRHAGE, ANTEPARTUM: ICD-10-CM

## 2019-01-31 DIAGNOSIS — Z87.59 H/O MIGRAINE DURING PREGNANCY: ICD-10-CM

## 2019-01-31 PROCEDURE — 0502F SUBSEQUENT PRENATAL CARE: CPT | Performed by: NURSE PRACTITIONER

## 2019-02-14 ENCOUNTER — ROUTINE PRENATAL (OUTPATIENT)
Dept: OBGYN CLINIC | Age: 44
End: 2019-02-14

## 2019-02-14 VITALS
HEART RATE: 97 BPM | DIASTOLIC BLOOD PRESSURE: 70 MMHG | BODY MASS INDEX: 34.86 KG/M2 | SYSTOLIC BLOOD PRESSURE: 114 MMHG | WEIGHT: 216 LBS

## 2019-02-14 DIAGNOSIS — Z82.79 FH: DOWN SYNDROME: ICD-10-CM

## 2019-02-14 DIAGNOSIS — O99.810 ABNORMAL GLUCOSE TOLERANCE TEST (GTT) DURING PREGNANCY, ANTEPARTUM: ICD-10-CM

## 2019-02-14 DIAGNOSIS — Z3A.32 32 WEEKS GESTATION OF PREGNANCY: ICD-10-CM

## 2019-02-14 DIAGNOSIS — Z86.69 H/O MIGRAINE DURING PREGNANCY: ICD-10-CM

## 2019-02-14 DIAGNOSIS — O09.521 ELDERLY MULTIGRAVIDA IN FIRST TRIMESTER: ICD-10-CM

## 2019-02-14 DIAGNOSIS — O35.03X0 CHOROID PLEXUS CYST OF FETUS AFFECTING CARE OF MOTHER, ANTEPARTUM, SINGLE OR UNSPECIFIED FETUS: ICD-10-CM

## 2019-02-14 DIAGNOSIS — O09.93 HIGH-RISK PREGNANCY IN THIRD TRIMESTER: Primary | ICD-10-CM

## 2019-02-14 DIAGNOSIS — Z87.59 H/O MIGRAINE DURING PREGNANCY: ICD-10-CM

## 2019-02-14 PROCEDURE — 0502F SUBSEQUENT PRENATAL CARE: CPT | Performed by: OBSTETRICS & GYNECOLOGY

## 2019-02-20 ENCOUNTER — OFFICE VISIT (OUTPATIENT)
Dept: OBGYN CLINIC | Age: 44
End: 2019-02-20

## 2019-02-20 DIAGNOSIS — Z3A.33 33 WEEKS GESTATION OF PREGNANCY: Primary | ICD-10-CM

## 2019-02-20 DIAGNOSIS — Z3A.32 32 WEEKS GESTATION OF PREGNANCY: ICD-10-CM

## 2019-02-20 DIAGNOSIS — O09.93 HIGH-RISK PREGNANCY IN THIRD TRIMESTER: ICD-10-CM

## 2019-02-20 LAB
ABDOMINAL CIRCUMFERENCE: NORMAL CM
BIPARIETAL DIAMETER: NORMAL CM
ESTIMATED FETAL WEIGHT: NORMAL GRAMS
FEMORAL DIAMETER: NORMAL CM
HC/AC: NORMAL
HEAD CIRCUMFERENCE: NORMAL CM

## 2019-02-20 PROCEDURE — 76816 OB US FOLLOW-UP PER FETUS: CPT | Performed by: OBSTETRICS & GYNECOLOGY

## 2019-02-28 ENCOUNTER — ROUTINE PRENATAL (OUTPATIENT)
Dept: OBGYN CLINIC | Age: 44
End: 2019-02-28

## 2019-02-28 VITALS
DIASTOLIC BLOOD PRESSURE: 78 MMHG | HEART RATE: 84 BPM | BODY MASS INDEX: 35.19 KG/M2 | SYSTOLIC BLOOD PRESSURE: 118 MMHG | WEIGHT: 218 LBS

## 2019-02-28 DIAGNOSIS — O35.03X0 CHOROID PLEXUS CYST OF FETUS AFFECTING CARE OF MOTHER, ANTEPARTUM, SINGLE OR UNSPECIFIED FETUS: ICD-10-CM

## 2019-02-28 DIAGNOSIS — O99.810 ABNORMAL GLUCOSE TOLERANCE TEST (GTT) DURING PREGNANCY, ANTEPARTUM: ICD-10-CM

## 2019-02-28 DIAGNOSIS — Z86.69 H/O MIGRAINE DURING PREGNANCY: ICD-10-CM

## 2019-02-28 DIAGNOSIS — O09.521 ELDERLY MULTIGRAVIDA IN FIRST TRIMESTER: ICD-10-CM

## 2019-02-28 DIAGNOSIS — Z82.79 FH: DOWN SYNDROME: ICD-10-CM

## 2019-02-28 DIAGNOSIS — Z87.59 H/O MIGRAINE DURING PREGNANCY: ICD-10-CM

## 2019-02-28 PROCEDURE — 0502F SUBSEQUENT PRENATAL CARE: CPT | Performed by: NURSE PRACTITIONER

## 2019-03-14 ENCOUNTER — HOSPITAL ENCOUNTER (OUTPATIENT)
Age: 44
Setting detail: SPECIMEN
Discharge: HOME | End: 2019-03-14
Payer: COMMERCIAL

## 2019-03-14 ENCOUNTER — ROUTINE PRENATAL (OUTPATIENT)
Dept: OBGYN CLINIC | Age: 44
End: 2019-03-14

## 2019-03-14 VITALS
WEIGHT: 219 LBS | DIASTOLIC BLOOD PRESSURE: 70 MMHG | SYSTOLIC BLOOD PRESSURE: 118 MMHG | HEART RATE: 88 BPM | BODY MASS INDEX: 35.35 KG/M2

## 2019-03-14 DIAGNOSIS — Z82.79 FH: DOWN SYNDROME: ICD-10-CM

## 2019-03-14 DIAGNOSIS — Z86.69 H/O MIGRAINE DURING PREGNANCY: ICD-10-CM

## 2019-03-14 DIAGNOSIS — O09.93 HIGH-RISK PREGNANCY IN THIRD TRIMESTER: Primary | ICD-10-CM

## 2019-03-14 DIAGNOSIS — Z3A.36 36 WEEKS GESTATION OF PREGNANCY: ICD-10-CM

## 2019-03-14 DIAGNOSIS — O09.521 ELDERLY MULTIGRAVIDA IN FIRST TRIMESTER: ICD-10-CM

## 2019-03-14 DIAGNOSIS — O35.03X0 CHOROID PLEXUS CYST OF FETUS AFFECTING CARE OF MOTHER, ANTEPARTUM, SINGLE OR UNSPECIFIED FETUS: ICD-10-CM

## 2019-03-14 DIAGNOSIS — O99.810 ABNORMAL GLUCOSE TOLERANCE TEST (GTT) DURING PREGNANCY, ANTEPARTUM: ICD-10-CM

## 2019-03-14 DIAGNOSIS — Z87.59 H/O MIGRAINE DURING PREGNANCY: ICD-10-CM

## 2019-03-14 PROCEDURE — 87081 CULTURE SCREEN ONLY: CPT

## 2019-03-14 PROCEDURE — 0502F SUBSEQUENT PRENATAL CARE: CPT | Performed by: OBSTETRICS & GYNECOLOGY

## 2019-03-17 LAB
CULTURE: NORMAL
Lab: NORMAL
SPECIMEN DESCRIPTION: NORMAL

## 2019-03-21 ENCOUNTER — ROUTINE PRENATAL (OUTPATIENT)
Dept: OBGYN CLINIC | Age: 44
End: 2019-03-21

## 2019-03-21 VITALS
SYSTOLIC BLOOD PRESSURE: 114 MMHG | BODY MASS INDEX: 35.35 KG/M2 | DIASTOLIC BLOOD PRESSURE: 70 MMHG | WEIGHT: 219 LBS | HEART RATE: 72 BPM

## 2019-03-21 DIAGNOSIS — O09.93 HIGH-RISK PREGNANCY IN THIRD TRIMESTER: Primary | ICD-10-CM

## 2019-03-21 DIAGNOSIS — O35.03X0 CHOROID PLEXUS CYST OF FETUS AFFECTING CARE OF MOTHER, ANTEPARTUM, SINGLE OR UNSPECIFIED FETUS: ICD-10-CM

## 2019-03-21 DIAGNOSIS — Z87.59 H/O MIGRAINE DURING PREGNANCY: ICD-10-CM

## 2019-03-21 DIAGNOSIS — O99.810 ABNORMAL GLUCOSE TOLERANCE TEST (GTT) DURING PREGNANCY, ANTEPARTUM: ICD-10-CM

## 2019-03-21 DIAGNOSIS — O09.521 ELDERLY MULTIGRAVIDA IN FIRST TRIMESTER: ICD-10-CM

## 2019-03-21 DIAGNOSIS — Z82.79 FH: DOWN SYNDROME: ICD-10-CM

## 2019-03-21 DIAGNOSIS — Z3A.37 37 WEEKS GESTATION OF PREGNANCY: ICD-10-CM

## 2019-03-21 DIAGNOSIS — Z86.69 H/O MIGRAINE DURING PREGNANCY: ICD-10-CM

## 2019-03-21 PROCEDURE — 0502F SUBSEQUENT PRENATAL CARE: CPT | Performed by: OBSTETRICS & GYNECOLOGY

## 2019-03-28 ENCOUNTER — ROUTINE PRENATAL (OUTPATIENT)
Dept: OBGYN CLINIC | Age: 44
End: 2019-03-28

## 2019-03-28 VITALS
DIASTOLIC BLOOD PRESSURE: 88 MMHG | WEIGHT: 221.19 LBS | BODY MASS INDEX: 35.7 KG/M2 | SYSTOLIC BLOOD PRESSURE: 118 MMHG | HEART RATE: 80 BPM

## 2019-03-28 DIAGNOSIS — O35.03X0 CHOROID PLEXUS CYST OF FETUS AFFECTING CARE OF MOTHER, ANTEPARTUM, SINGLE OR UNSPECIFIED FETUS: ICD-10-CM

## 2019-03-28 DIAGNOSIS — O09.521 ELDERLY MULTIGRAVIDA IN FIRST TRIMESTER: ICD-10-CM

## 2019-03-28 DIAGNOSIS — Z82.79 FH: DOWN SYNDROME: ICD-10-CM

## 2019-03-28 DIAGNOSIS — O99.810 ABNORMAL GLUCOSE TOLERANCE TEST (GTT) DURING PREGNANCY, ANTEPARTUM: ICD-10-CM

## 2019-03-28 DIAGNOSIS — Z87.59 H/O MIGRAINE DURING PREGNANCY: ICD-10-CM

## 2019-03-28 DIAGNOSIS — Z86.69 H/O MIGRAINE DURING PREGNANCY: ICD-10-CM

## 2019-03-28 DIAGNOSIS — Z3A.38 38 WEEKS GESTATION OF PREGNANCY: Primary | ICD-10-CM

## 2019-03-28 PROCEDURE — 0502F SUBSEQUENT PRENATAL CARE: CPT | Performed by: NURSE PRACTITIONER

## 2019-04-04 ENCOUNTER — ROUTINE PRENATAL (OUTPATIENT)
Dept: OBGYN CLINIC | Age: 44
End: 2019-04-04

## 2019-04-04 VITALS
DIASTOLIC BLOOD PRESSURE: 74 MMHG | WEIGHT: 224 LBS | BODY MASS INDEX: 36.15 KG/M2 | SYSTOLIC BLOOD PRESSURE: 120 MMHG | HEART RATE: 84 BPM

## 2019-04-04 DIAGNOSIS — O09.521 ELDERLY MULTIGRAVIDA IN FIRST TRIMESTER: ICD-10-CM

## 2019-04-04 DIAGNOSIS — O99.810 ABNORMAL GLUCOSE TOLERANCE TEST (GTT) DURING PREGNANCY, ANTEPARTUM: ICD-10-CM

## 2019-04-04 DIAGNOSIS — Z87.59 H/O MIGRAINE DURING PREGNANCY: ICD-10-CM

## 2019-04-04 DIAGNOSIS — Z86.69 H/O MIGRAINE DURING PREGNANCY: ICD-10-CM

## 2019-04-04 DIAGNOSIS — O35.03X0 CHOROID PLEXUS CYST OF FETUS AFFECTING CARE OF MOTHER, ANTEPARTUM, SINGLE OR UNSPECIFIED FETUS: ICD-10-CM

## 2019-04-04 DIAGNOSIS — Z3A.39 39 WEEKS GESTATION OF PREGNANCY: Primary | ICD-10-CM

## 2019-04-04 DIAGNOSIS — Z82.79 FH: DOWN SYNDROME: ICD-10-CM

## 2019-04-04 PROCEDURE — 0502F SUBSEQUENT PRENATAL CARE: CPT | Performed by: OBSTETRICS & GYNECOLOGY

## 2019-04-04 NOTE — PROGRESS NOTES
Kaylin Young is a 37 y.o. female 39w2d        OB History    Para Term  AB Living   2 1 1     1   SAB TAB Ectopic Molar Multiple Live Births             1      # Outcome Date GA Lbr Mansoor/2nd Weight Sex Delivery Anes PTL Lv   2 Current            1 Term 10/03/14 39w3d  7 lb 3 oz (3.26 kg) F Vag-Spont EPI  OBED       Vitals  BP: 120/74  Weight: 224 lb (101.6 kg)  Pulse: 84  Patient Position: Sitting  Albumin: Negative  Glucose: Negative      The patient was seen and evaluated. There was positive fetal movements. No contractions or leakage of fluid. Signs and symptoms of labor were reviewed. The S/S of Pre-Eclampsia were reviewed with the patient in detail. She is to report any of these if they occur. She currently denies any of these. The patient was instructed on fetal kick counts and was given a kick sheet to complete every 8 hours. She was instructed that the baby should move at a minimum of ten times within one hour after a meal. The patient was instructed to lay down on her left side twenty minutes after eating and count movements for up to one hour with a target value of ten movements. She was instructed to notify the office if she did not make that target after two attempts or if after any attempt there was less than four movements. The patient reports that the targets have been made Yes.    19 pt declined Tdap   18 Pt declined Flu Vacc  10/26/2018 Daily oral baby ASA 81mg PO based on the guidelines of the USPSTF and ACOG for the prevention of preeclampsia in women considered high risk during the pregnancy (patient's BMI > 30, AMA). T-Dap Vaccine Completed (27-36 weeks): No    Allergies: Allergies as of 2019    (No Known Allergies)         Group Beta Strep collection was completed. Yes  GBS Results:   No visits with results within 3 Week(s) from this visit.    Latest known visit with results is:   Hospital Outpatient Visit on 2019   Component Date Value Ref Range Status    Specimen Description 2019 . VAGINAL SPECIMEN   Final    Special Requests 2019 NOT REPORTED   Final    Culture 2019 NEGATIVE FOR GROUP B STREPTOCOCCI   Final   ]        Cervical Exam was:   1/50%/-2/V/I cm dilated    The literature regarding a questionable link to pitocin augmentation and induction of labor, the assistance of labor contractions and the initiation of contractions to help delivery, have been reviewed with the patient regarding the increased potential of having a  with Attention Deficit Hyperactivity Disorder and or Autism. These two disorders and the ramifications of their impact on a child and the family caring for that child has been reviewed with the patient in detail. She was given the risks, benefits and alternatives of the use of this medication. She has agreed to its use in the delivery of her unborn child if needed at the time of delivery, Yes. The patient was counseled on the mandatory call ahead policy. She has been instructed to call the office at anytime prior to going into the hospital so the on-call physician may direct her to the appropriate facility for care. Exceptions were reviewed including but not limited to: Decreased fetal movement, vaginal Bleeding or hemorrhage, trauma, readily expectant delivery, or any instance where she feels 911 should be utilized. The patient was counseled on Labor & Delivery. Route of delivery and counseling on vaginal, operative vaginal, and  sections were completed with the risks of each to both the patient as well as her baby. The possibility of a blood transfusion was discussed as well. The patient was not opposed to receiving a transfusion if needed. The patient was counseled on types of analgesia during labor and is considering either Regional or IV medication the risks, benefits and alternatives were discussed. Assessment:  1. Prashant Leesa is a 37 y.o. female  2. testing      Narcolepsy      Priority: High     Overview Note:     Pt takes xyrem and modafinal  Refer to [facility] for consult and first trimester screen      Migraine      Priority: Medium    Hx of abnormal Pap smear 03/26/2014     Priority: Low     Overview Note:       Change 16 weeks U/S at [facility] to anatomy scan at 20 weeks      Suspected fetal anomaly not found 01/22/2019    Suspected placental problem not found 01/22/2019    Elderly multigravida in third trimester 01/22/2019    Choroid plexus cysts, fetal, affecting care of mother, antepartum 11/27/2018     Overview Note:     11/27/2018 follow up at 28 weeks for stigmata of fetal congenital/chromosomal abnormalities that may become apparent with advancing gestational age  Declines invasive prenatal testing  Opted for non-invasive testing with [facility]      H/O migraine during pregnancy 10/23/2018    Hereditary disease in family possibly affecting fetus, affecting management of mother, antepartum condition or complication 50/09/8514    Elderly multigravida in second trimester 10/23/2018        Diagnosis Orders   1. 39 weeks gestation of pregnancy     2. Abnormal glucose tolerance test (GTT) during pregnancy, antepartum     3. FH: Down syndrome     4. Elderly multigravida in first trimester     5. Choroid plexus cyst of fetus affecting care of mother, antepartum, single or unspecified fetus     6. H/O migraine during pregnancy             Plan:  The patient will return to the office for her next visit in 1 weeks. See antepartum flow sheet. Pt declined IOL until 4/10/19.  Pt was counseled on risks/ benefits/ alternative options

## 2019-04-09 ENCOUNTER — ROUTINE PRENATAL (OUTPATIENT)
Dept: OBGYN CLINIC | Age: 44
End: 2019-04-09

## 2019-04-09 VITALS
WEIGHT: 224 LBS | BODY MASS INDEX: 36.15 KG/M2 | DIASTOLIC BLOOD PRESSURE: 68 MMHG | HEART RATE: 97 BPM | SYSTOLIC BLOOD PRESSURE: 104 MMHG

## 2019-04-09 DIAGNOSIS — O09.521 ELDERLY MULTIGRAVIDA IN FIRST TRIMESTER: ICD-10-CM

## 2019-04-09 DIAGNOSIS — O35.03X0 CHOROID PLEXUS CYST OF FETUS AFFECTING CARE OF MOTHER, ANTEPARTUM, SINGLE OR UNSPECIFIED FETUS: ICD-10-CM

## 2019-04-09 DIAGNOSIS — O99.810 ABNORMAL GLUCOSE TOLERANCE TEST (GTT) DURING PREGNANCY, ANTEPARTUM: ICD-10-CM

## 2019-04-09 DIAGNOSIS — Z86.69 H/O MIGRAINE DURING PREGNANCY: ICD-10-CM

## 2019-04-09 DIAGNOSIS — Z82.79 FH: DOWN SYNDROME: ICD-10-CM

## 2019-04-09 DIAGNOSIS — O09.93 HIGH-RISK PREGNANCY IN THIRD TRIMESTER: Primary | ICD-10-CM

## 2019-04-09 DIAGNOSIS — Z87.59 H/O MIGRAINE DURING PREGNANCY: ICD-10-CM

## 2019-04-09 DIAGNOSIS — Z3A.40 40 WEEKS GESTATION OF PREGNANCY: ICD-10-CM

## 2019-04-09 PROCEDURE — 0502F SUBSEQUENT PRENATAL CARE: CPT | Performed by: OBSTETRICS & GYNECOLOGY

## 2019-04-09 NOTE — PROGRESS NOTES
Azar Wills is a 37 y.o. female 37w0d        OB History    Para Term  AB Living   2 1 1     1   SAB TAB Ectopic Molar Multiple Live Births             1      # Outcome Date GA Lbr Mansoor/2nd Weight Sex Delivery Anes PTL Lv   2 Current            1 Term 10/03/14 39w3d  7 lb 3 oz (3.26 kg) F Vag-Spont EPI  OBED       Vitals  BP: 104/68  Weight: 224 lb (101.6 kg)  Pulse: 97  Patient Position: Sitting  Albumin: Negative  Glucose: Negative      The patient was seen and evaluated. There was positive fetal movements. No contractions or leakage of fluid. Signs and symptoms of labor were reviewed. The S/S of Pre-Eclampsia were reviewed with the patient in detail. She is to report any of these if they occur. She currently denies any of these. The patient was instructed on fetal kick counts and was given a kick sheet to complete every 8 hours. She was instructed that the baby should move at a minimum of ten times within one hour after a meal. The patient was instructed to lay down on her left side twenty minutes after eating and count movements for up to one hour with a target value of ten movements. She was instructed to notify the office if she did not make that target after two attempts or if after any attempt there was less than four movements. The patient reports that the targets have been made Yes.    19 pt declined Tdap   18 Pt declined Flu Vacc  10/26/2018 Daily oral baby ASA 81mg PO based on the guidelines of the USPSTF and ACOG for the prevention of preeclampsia in women considered high risk during the pregnancy (patient's BMI > 30, AMA). T-Dap Vaccine Completed (27-36 weeks): No    Allergies: Allergies as of 2019    (No Known Allergies)         Group Beta Strep collection was completed. Yes  GBS Results:   No visits with results within 3 Week(s) from this visit.    Latest known visit with results is:   Hospital Outpatient Visit on 2019   Component Date Value Ref Range Status    Specimen Description 2019 . VAGINAL SPECIMEN   Final    Special Requests 2019 NOT REPORTED   Final    Culture 2019 NEGATIVE FOR GROUP B STREPTOCOCCI   Final   ]        Cervical Exam was:   2/60%/-1/V/I cm dilated    The literature regarding a questionable link to pitocin augmentation and induction of labor, the assistance of labor contractions and the initiation of contractions to help delivery, have been reviewed with the patient regarding the increased potential of having a  with Attention Deficit Hyperactivity Disorder and or Autism. These two disorders and the ramifications of their impact on a child and the family caring for that child has been reviewed with the patient in detail. She was given the risks, benefits and alternatives of the use of this medication. She has agreed to its use in the delivery of her unborn child if needed at the time of delivery, Yes. The patient was counseled on the mandatory call ahead policy. She has been instructed to call the office at anytime prior to going into the hospital so the on-call physician may direct her to the appropriate facility for care. Exceptions were reviewed including but not limited to: Decreased fetal movement, vaginal Bleeding or hemorrhage, trauma, readily expectant delivery, or any instance where she feels 911 should be utilized. The patient was counseled on Labor & Delivery. Route of delivery and counseling on vaginal, operative vaginal, and  sections were completed with the risks of each to both the patient as well as her baby. The possibility of a blood transfusion was discussed as well. The patient was not opposed to receiving a transfusion if needed. The patient was counseled on types of analgesia during labor and is considering either Regional or IV medication the risks, benefits and alternatives were discussed. Assessment:  1. Aida Rendon is a 37 y.o. female  2.   3. 40w0d    Patient Active Problem List    Diagnosis Date Noted    Abnormal glucose tolerance test (GTT) during pregnancy, antepartum 01/15/2019     Priority: High     Overview Note:     1/15/2019 3 hour GTT and Hgb A1c ordered      Elderly multigravida in first trimester 2014     Priority: High     Overview Note:     FOB 55 with family hx of cousins x 2 with down syndrome   Refer to [facility] for consult and first trimester screen  10/23/2018 Declined invasive prenatal testing, declined NIPT testing through [email or web address]    Advanced Maternal Age Counseling    If a woman is over the age of 28, she is considered to be of Advanced Maternal Age, and with this title comes risks for mom and baby. Increased risk of Down Syndrome - the most common chromosomal birth defect (chromosomes - cells that carry genes and transmit heredity information)   Increased risk of miscarriage   20% increase at ages 28 to 44   35% increase at ages 42-38   Over 50% increase by age 39  Increased risk of  Section () for delivery method   Gestational diabetes - diabetes that develops for the first time during pregnancy. Women who have this could possibly have a very large baby who then is at risk for injuries during delivery. Pregnancy Induced Hypertension - High blood pressure   Placental Problems - one of the most common placental problems is placenta previa in which the placenta covers all or part of the uterine opening (cervix). This can cause severe bleeding during delivery and can make  delivery necessary. Even with all of these increased risks, with the advancement in medical procedures and testing, there are several ways to reduce your risk. They include early and regular prenatal care, taking the multivitamin with folic acid prescribed by your health care manager, beginning pregnancy at a healthy weight, not smoking or drinking alcoholic beverages, and eating healthy foods.    There are tests that all pregnant women are encouraged to take, but there is additional prenatal testing that is regularly offered to any woman 28 or older because of the potential of increased risks to the mother and baby. These tests are chorionic villus sampling (CVS) and amniocentesis. Liliya Rolle is also available which is a non-invasive option for fetal karyotyping. With CVS, a small sample of cells (called chorionic villi) is taken from the placenta where it attached to the wall of the uterus. Chorionic villi are tiny parts of the placenta; therefore they have the same genes as the baby. This test is 98% accurate, but can carry a slightly higher risk of miscarriage than amniocentesis, since the procedure is done in early pregnancy. Amniocentesis is a procedure where a sample of fluid is removed from the amniotic sac for analysis and evaluation. During this procedure, fluid is removed by placing a long needle through the abdominal wall into the amniotic sac. The amniocentesis needle is typically guided into the sac with the help of ultrasound imaging. Once the needle is in the sac, a syringe is used to withdraw the clear taryn-colored amniotic fluid, which looks a bit like urine. NIPT, utilizes the maternal blood to test for fetal cells. These fetal cells are then karyotyped for genetic evaluation. All of these tests, CVS, NIPT and amniocentesis, let couples know if the fetus will have genetic abnormalities, and will help them make informed decisions regarding their pregnancy. Karyotyping by either CVS, NIPT or Amniocentesis is the ONLY way to confirm the genetic chromosomal structure regarding trisomy; Down's Syndrome, Edward's or Pateau's. [facility] was reviewed. If NIPT is positive then a confirmatory amniocentesis would be recommended to confirm the diagnosis.         FH: Down syndrome 03/03/2014     Priority: High     Overview Note:     FOB has cousins with down syndrome x 2  10/23/2018 Declines invasive and noninvasive prenatal testing      Narcolepsy      Priority: High     Overview Note:     Pt takes xyrem and modafinal  Refer to [facility] for consult and first trimester screen      Migraine      Priority: Medium    Hx of abnormal Pap smear 03/26/2014     Priority: Low     Overview Note:       Change 16 weeks U/S at [facility] to anatomy scan at 20 weeks      Suspected fetal anomaly not found 01/22/2019    Suspected placental problem not found 01/22/2019    Elderly multigravida in third trimester 01/22/2019    Choroid plexus cysts, fetal, affecting care of mother, antepartum 11/27/2018     Overview Note:     11/27/2018 follow up at 28 weeks for stigmata of fetal congenital/chromosomal abnormalities that may become apparent with advancing gestational age  Declines invasive prenatal testing  Opted for non-invasive testing with [facility]      H/O migraine during pregnancy 10/23/2018    Hereditary disease in family possibly affecting fetus, affecting management of mother, antepartum condition or complication 33/39/8666    Elderly multigravida in second trimester 10/23/2018        Diagnosis Orders   1. High-risk pregnancy in third trimester     2. Abnormal glucose tolerance test (GTT) during pregnancy, antepartum     3. FH: Down syndrome     4. Elderly multigravida in first trimester     5. Choroid plexus cyst of fetus affecting care of mother, antepartum, single or unspecified fetus     6. H/O migraine during pregnancy     7. 40 weeks gestation of pregnancy             Plan:  The patient will return to the office for her next visit in 2-3 weeks for postpartum. See antepartum flow sheet. Medical Induction of Labor     Definition   In some cases, the doctor needs to help labor begin by using:   Medicine   Other procedures   This is done instead of waiting for your body to go into labor on its own. In the [address] [place] [address], nearly one in five labors is induced.    Reasons for Procedure   The most common reason to have an induction is that the pregnancy has gone two or more weeks past the due date. In this situation, the baby may:   Get too large for a vaginal delivery   Not be able to receive enough oxygen through the placenta (the organ that links the mother and the baby)   Other reasons for induction include:   Water breaks and contractions do not begin   High blood pressure or diabetes in the mother   Infection in the uterus   The baby is not growing properly   Low amniotic fluid level   Possible Complications   The same complications that may occur from a spontaneous delivery also apply to an induced delivery, as well as the following risks:   Stalled labor--If the medicine does not trigger labor, you may need a  section (). Strong contractions--The medicine that causes contractions could make them too strong. Although rare, this can lead to fetal distress and uterine rupture. In the event that your contractions are too strong, your doctor will lower the dose or stop the medicine. Be sure to discuss these risks with your doctor before the procedure. What to Expect   Prior to Procedure   While the same instructions apply for an induced labor as for a spontaneous birth, there are some differences. Do not eat too much before arriving at the hospital. (It is okay to have clear fluids, though.) The medicines can create very strong contractions and could upset your stomach. Contractions slow the digestive process, so your stomach will remain full. This can cause a problem if you need general anesthesia . Description of the Procedure Cervical Ripening   To deliver your baby vaginally, the cervix will need to [address].  This means it needs to soften, thin, and open to prepare for delivery. If your cervix is not doing this already, your doctor may aid this process by giving you medicine, which may be a:   Gel that is applied to the cervix   Suppository put in the vagina   Pill taken by mouth   The cervical ripening process can last for up to a few days. There are also procedures that your doctor may try to aid cervical ripening, such as:   Strip the membranes (separate your cervix from the tissues around the baby's head)   Expand a small balloon-tipped catheter in the uterus   Place small cylinders that contain a type of sponge-like seaweed into the uterus     Changes in the Cervix During Pregnancy       © 2011 [facility].   Contractions   If contractions have not started once your cervix is ripe, your doctor will give you a drug that causes contractions. The drug is a man-made version of a hormone called oxytocin. This hormone is produced by your body during active labor. The drug will be adjusted during labor to strengthen or weaken the contractions. Once contractions begin, the labor and birth process will be the same as a spontaneous delivery. Anesthesia   The same pain medicines are available for an induced labor as for a spontaneous delivery, including:   Pain medicine given into your vein   Epidural block   Spinal block   Local anesthesia   Immediately After Procedure   If everything goes well, after the induction you will vaginally deliver a healthy baby. How Long Will It Take? It can be hours to several days (very rarely) from the time you are induced until the delivery. If your cervix is not ripe when you are scheduled for the induction, labor and delivery could take 2-3 days. It could take longer for first-time mothers and for pre-term babies. How Much Will It Hurt? Labor causes severe pain. Talk to your doctor about ways to manage the pain. [address] Stay   The usual length of stay is 1-3 days. Your doctor may choose to keep you longer if you have any problems. Postoperative Care   The care after an induced labor is the same as for a spontaneous birth. Call Your Doctor   When you go home after having a vaginal delivery, call your doctor if any of the following occurs:    An unexplained fever of 100.4°F (38°C) or above in the first two weeks   Soaking more than one sanitary napkin an hour or if the bleeding level increases   Wounds that become red, swollen, or drain pus   New pain, swelling, or tenderness in your legs   Hot-to-the-touch, significantly reddened, sore breasts   Any cracking or bleeding from the nipple or areola (the dark-colored area of the breast)   Foul-smelling vaginal discharge   Painful urination or a sudden urge to urinate, inability to control urination   Increasing pain in the vaginal area   Cough or chest pain, nausea, or vomiting   Depression, hallucinations, suicidal thoughts, or any thoughts of harming your baby   In case of an emergency, CALL 911 .

## 2019-04-10 ENCOUNTER — HOSPITAL ENCOUNTER (INPATIENT)
Age: 44
LOS: 1 days | Discharge: HOME | End: 2019-04-12
Attending: OBSTETRICS & GYNECOLOGY | Admitting: OBSTETRICS & GYNECOLOGY
Payer: COMMERCIAL

## 2019-04-10 ENCOUNTER — ANESTHESIA EVENT (OUTPATIENT)
Dept: LABOR AND DELIVERY | Age: 44
End: 2019-04-10
Payer: COMMERCIAL

## 2019-04-10 ENCOUNTER — APPOINTMENT (OUTPATIENT)
Dept: LABOR AND DELIVERY | Age: 44
End: 2019-04-10
Payer: COMMERCIAL

## 2019-04-10 ENCOUNTER — ANESTHESIA (OUTPATIENT)
Dept: LABOR AND DELIVERY | Age: 44
End: 2019-04-10
Payer: COMMERCIAL

## 2019-04-10 PROBLEM — Z03.73 SUSPECTED FETAL ANOMALY NOT FOUND: Status: RESOLVED | Noted: 2019-01-22 | Resolved: 2019-04-10

## 2019-04-10 PROBLEM — O09.522 ELDERLY MULTIGRAVIDA IN SECOND TRIMESTER: Status: RESOLVED | Noted: 2018-10-23 | Resolved: 2019-04-10

## 2019-04-10 PROBLEM — Z03.72 SUSPECTED PLACENTAL PROBLEM NOT FOUND: Status: RESOLVED | Noted: 2019-01-22 | Resolved: 2019-04-10

## 2019-04-10 PROBLEM — O35.2XX0 HEREDITARY DISEASE IN FAMILY POSSIBLY AFFECTING FETUS, AFFECTING MANAGEMENT OF MOTHER, ANTEPARTUM CONDITION OR COMPLICATION: Status: RESOLVED | Noted: 2018-10-23 | Resolved: 2019-04-10

## 2019-04-10 LAB
ABO/RH: NORMAL
ABSOLUTE EOS #: 0 K/UL (ref 0–0.4)
ABSOLUTE IMMATURE GRANULOCYTE: ABNORMAL K/UL (ref 0–0.3)
ABSOLUTE LYMPH #: 1.5 K/UL (ref 1–4.8)
ABSOLUTE MONO #: 0.5 K/UL (ref 0.1–1.3)
AMPHETAMINE SCREEN URINE: NEGATIVE
ANTIBODY SCREEN: NEGATIVE
ARM BAND NUMBER: NORMAL
BARBITURATE SCREEN URINE: NEGATIVE
BASOPHILS # BLD: 1 % (ref 0–2)
BASOPHILS ABSOLUTE: 0.1 K/UL (ref 0–0.2)
BENZODIAZEPINE SCREEN, URINE: NEGATIVE
BILIRUBIN URINE: NEGATIVE
BUPRENORPHINE URINE: NORMAL
CANNABINOID SCREEN URINE: NEGATIVE
COCAINE METABOLITE, URINE: NEGATIVE
COLOR: YELLOW
COMMENT UA: NORMAL
DIFFERENTIAL TYPE: ABNORMAL
EOSINOPHILS RELATIVE PERCENT: 1 % (ref 0–4)
EXPIRATION DATE: NORMAL
GLUCOSE URINE: NEGATIVE
HCT VFR BLD CALC: 38.8 % (ref 36–46)
HEMOGLOBIN: 13.5 G/DL (ref 12–16)
IMMATURE GRANULOCYTES: ABNORMAL %
KETONES, URINE: NEGATIVE
LEUKOCYTE ESTERASE, URINE: NEGATIVE
LYMPHOCYTES # BLD: 15 % (ref 24–44)
MCH RBC QN AUTO: 31.3 PG (ref 26–34)
MCHC RBC AUTO-ENTMCNC: 34.7 G/DL (ref 31–37)
MCV RBC AUTO: 90.1 FL (ref 80–100)
MDMA URINE: NORMAL
METHADONE SCREEN, URINE: NEGATIVE
METHAMPHETAMINE, URINE: NORMAL
MONOCYTES # BLD: 5 % (ref 1–7)
NITRITE, URINE: NEGATIVE
NRBC AUTOMATED: ABNORMAL PER 100 WBC
OPIATES, URINE: NEGATIVE
OXYCODONE SCREEN URINE: NEGATIVE
PDW BLD-RTO: 13.8 % (ref 11.5–14.9)
PH UA: 7.5 (ref 5–8)
PHENCYCLIDINE, URINE: NEGATIVE
PLATELET # BLD: 179 K/UL (ref 150–450)
PLATELET ESTIMATE: ABNORMAL
PMV BLD AUTO: 10.6 FL (ref 6–12)
PROPOXYPHENE, URINE: NORMAL
PROTEIN UA: NEGATIVE
RBC # BLD: 4.31 M/UL (ref 4–5.2)
RBC # BLD: ABNORMAL 10*6/UL
SEG NEUTROPHILS: 78 % (ref 36–66)
SEGMENTED NEUTROPHILS ABSOLUTE COUNT: 7.8 K/UL (ref 1.3–9.1)
SPECIFIC GRAVITY UA: 1.01 (ref 1–1.03)
T. PALLIDUM, IGG: NONREACTIVE
TEST INFORMATION: NORMAL
TRICYCLIC ANTIDEPRESSANTS, UR: NORMAL
TURBIDITY: CLEAR
URINE HGB: NEGATIVE
UROBILINOGEN, URINE: NORMAL
WBC # BLD: 9.8 K/UL (ref 3.5–11)
WBC # BLD: ABNORMAL 10*3/UL

## 2019-04-10 PROCEDURE — 80307 DRUG TEST PRSMV CHEM ANLYZR: CPT

## 2019-04-10 PROCEDURE — 76815 OB US LIMITED FETUS(S): CPT

## 2019-04-10 PROCEDURE — 85025 COMPLETE CBC W/AUTO DIFF WBC: CPT

## 2019-04-10 PROCEDURE — 59200 INSERT CERVICAL DILATOR: CPT

## 2019-04-10 PROCEDURE — 86780 TREPONEMA PALLIDUM: CPT

## 2019-04-10 PROCEDURE — 4A1HXCZ MONITORING OF PRODUCTS OF CONCEPTION, CARDIAC RATE, EXTERNAL APPROACH: ICD-10-PCS | Performed by: OBSTETRICS & GYNECOLOGY

## 2019-04-10 PROCEDURE — 86850 RBC ANTIBODY SCREEN: CPT

## 2019-04-10 PROCEDURE — 86900 BLOOD TYPING SEROLOGIC ABO: CPT

## 2019-04-10 PROCEDURE — 6360000002 HC RX W HCPCS: Performed by: STUDENT IN AN ORGANIZED HEALTH CARE EDUCATION/TRAINING PROGRAM

## 2019-04-10 PROCEDURE — 2580000003 HC RX 258: Performed by: STUDENT IN AN ORGANIZED HEALTH CARE EDUCATION/TRAINING PROGRAM

## 2019-04-10 PROCEDURE — 3700000025 EPIDURAL BLOCK: Performed by: ANESTHESIOLOGY

## 2019-04-10 PROCEDURE — 6370000000 HC RX 637 (ALT 250 FOR IP): Performed by: STUDENT IN AN ORGANIZED HEALTH CARE EDUCATION/TRAINING PROGRAM

## 2019-04-10 PROCEDURE — 2500000003 HC RX 250 WO HCPCS: Performed by: ANESTHESIOLOGY

## 2019-04-10 PROCEDURE — 81003 URINALYSIS AUTO W/O SCOPE: CPT

## 2019-04-10 PROCEDURE — 86901 BLOOD TYPING SEROLOGIC RH(D): CPT

## 2019-04-10 RX ORDER — ONDANSETRON 2 MG/ML
4 INJECTION INTRAMUSCULAR; INTRAVENOUS EVERY 6 HOURS PRN
Status: DISCONTINUED | OUTPATIENT
Start: 2019-04-10 | End: 2019-04-11

## 2019-04-10 RX ORDER — BUPIVACAINE HYDROCHLORIDE 2.5 MG/ML
INJECTION, SOLUTION EPIDURAL; INFILTRATION; INTRACAUDAL PRN
Status: DISCONTINUED | OUTPATIENT
Start: 2019-04-10 | End: 2019-04-11 | Stop reason: SDUPTHER

## 2019-04-10 RX ORDER — SODIUM CHLORIDE 0.9 % (FLUSH) 0.9 %
10 SYRINGE (ML) INJECTION PRN
Status: DISCONTINUED | OUTPATIENT
Start: 2019-04-10 | End: 2019-04-11

## 2019-04-10 RX ORDER — ACETAMINOPHEN 500 MG
1000 TABLET ORAL EVERY 6 HOURS PRN
Status: DISCONTINUED | OUTPATIENT
Start: 2019-04-10 | End: 2019-04-11

## 2019-04-10 RX ORDER — NALOXONE HYDROCHLORIDE 0.4 MG/ML
0.4 INJECTION, SOLUTION INTRAMUSCULAR; INTRAVENOUS; SUBCUTANEOUS PRN
Status: DISCONTINUED | OUTPATIENT
Start: 2019-04-10 | End: 2019-04-11

## 2019-04-10 RX ORDER — NALBUPHINE HCL 10 MG/ML
5 AMPUL (ML) INJECTION EVERY 4 HOURS PRN
Status: DISCONTINUED | OUTPATIENT
Start: 2019-04-10 | End: 2019-04-11

## 2019-04-10 RX ORDER — SODIUM CHLORIDE, SODIUM LACTATE, POTASSIUM CHLORIDE, CALCIUM CHLORIDE 600; 310; 30; 20 MG/100ML; MG/100ML; MG/100ML; MG/100ML
INJECTION, SOLUTION INTRAVENOUS CONTINUOUS
Status: DISCONTINUED | OUTPATIENT
Start: 2019-04-10 | End: 2019-04-11

## 2019-04-10 RX ADMIN — BUPIVACAINE HYDROCHLORIDE 8 ML: 2.5 INJECTION, SOLUTION EPIDURAL; INFILTRATION; INTRACAUDAL; PERINEURAL at 20:55

## 2019-04-10 RX ADMIN — Medication 1 MILLI-UNITS/MIN: at 21:45

## 2019-04-10 RX ADMIN — DINOPROSTONE 10 MG: 10 INSERT VAGINAL at 14:13

## 2019-04-10 RX ADMIN — Medication 10 ML/HR: at 20:55

## 2019-04-10 RX ADMIN — Medication 8 ML/HR: at 20:59

## 2019-04-10 RX ADMIN — SODIUM CHLORIDE, POTASSIUM CHLORIDE, SODIUM LACTATE AND CALCIUM CHLORIDE: 600; 310; 30; 20 INJECTION, SOLUTION INTRAVENOUS at 13:09

## 2019-04-10 ASSESSMENT — PAIN DESCRIPTION - DESCRIPTORS
DESCRIPTORS: CRAMPING
DESCRIPTORS: CRAMPING

## 2019-04-10 ASSESSMENT — PAIN SCALES - GENERAL: PAINLEVEL_OUTOF10: 2

## 2019-04-10 NOTE — H&P
OBSTETRICAL HISTORY [address]    Date: 4/10/2019       Time: 5:09 PM   Patient Name: Sanjiv Gamble     Patient : 1975  Room/Bed: [facility]/7467-54    Admission Date/Time: 4/10/2019 12:20 PM      CC: eIOL     HPI: Sanjiv Gamble is a 37 y.o.  at 40w1d who presents to labor and delivery for eIOL. Patient denies any headache, visual changes, difficulty breathing, RUQ pain, N/V, F/C, and pain/swelling in lower extremities. The patient reports fetal movement is present, denies contractions, denies loss of fluid, denies vaginal bleeding. Pregnancy is complicated by AMA (91JR, declined NIPT), choroid plexus cyst, FH down syndrome, hx migraines, narcolepsy (on modafinal). DATING:  LMP: Patient's last menstrual period was 07/10/2018 (approximate).   Estimated Date of Delivery: 19   Based on: midtrimester ultrasound, at 15 2/7 weeks GA    PREGNANCY RISK FACTORS:  Patient Active Problem List   Diagnosis    Narcolepsy    Migraine    Elderly multigravida in first trimester    FH: Down syndrome    Hx of abnormal Pap smear    H/O migraine during pregnancy    Choroid plexus cysts, fetal, affecting care of mother, antepartum    Abnormal glucose tolerance test (GTT) during pregnancy, antepartum    Elderly multigravida in third trimester        Steroids Given In This Pregnancy:  no     REVIEW OF SYSTEMS:   Constitutional: negative fever, negative chills  HEENT: negative visual disturbances, negative headaches  Respiratory: negative dyspnea, negative cough  Cardiovascular: negative chest pain,  negative palpitations  Gastrointestinal: negative abdominal pain, negative RUQ pain, negative N/V, negative diarrhea, negative constipation  Genitourinary: negative dysuria, negative vaginal discharge, negative vaginal bleeding  Dermatological: negative rash, negative lesions, negative pruritus  Hematologic: negative bruising  Immunologic/Lymphatic: negative recent illness, negative recent sick contact  Musculoskeletal: negative back pain, negative myalgias, negative arthralgias  Neurological:  negative dizziness, negative weakness  Behavior/Psych: negative depression, negative anxiety      OBSTETRICAL HISTORY:   OB History    Para Term  AB Living   2 1 1 0 0 1   SAB TAB Ectopic Molar Multiple Live Births   0 0 0 0 0 1      # Outcome Date GA Lbr [place]/2nd Weight Sex Delivery Anes PTL Lv   2 Current            1 Term 10/03/14 39w3d  7 lb 3 oz (3.26 kg) F Vag-Spont EPI  OBED       PAST MEDICAL HISTORY:   has a past medical history of Migraine and Narcolepsy. PAST SURGICAL HISTORY:   has a past surgical history that includes Breast biopsy (Left, 2016). ALLERGIES:  is allergic to latex. MEDICATIONS:  Prior to Admission medications    Medication Sig Start Date End Date Taking? Authorizing Provider   B Complex-C (SUPER B COMPLEX PO) Take 2 tablets by mouth daily 18  Yes Historical Provider, MD   sodium oxybate (XYREM) 500 MG/ML SOLN solution Take 4.5 g by mouth. .   Yes Historical Provider, MD       FAMILY HISTORY:  family history includes Brain Cancer in her maternal grandmother; Diabetes in her paternal aunt and paternal grandmother; Heart Disease in her father; High Cholesterol in her father and mother; Hypertension in her father; Eulah Codie in her paternal aunt. SOCIAL HISTORY:   reports that she has never smoked. She has never used smokeless tobacco. She reports that she does not drink alcohol or use drugs.     VITALS:  Vitals:    04/10/19 1235 04/10/19 1244 04/10/19 1429 04/10/19 1645   BP: 136/75  127/62 123/65   Pulse: 82  72 67   Resp: 16  16 16   Temp: 98.8 °F (37.1 °C)  98.6 °F (37 °C) 99.2 °F (37.3 °C)   TempSrc: Infrared  Infrared Infrared   SpO2:  97%     Weight:  224 lb (101.6 kg)     Height:  5' 6\" (1.676 m)           PHYSICAL EXAM:  Fetal Heart Monitor:  Baseline Heart Rate 140, moderate variability, present accelerations, absent decelerations  [place]: contractions, none    General appearance:  no apparent distress, alert and cooperative  Neurologic:  alert, oriented, normal speech, no focal findings or movement disorder noted  Lungs:  No increased work of breathing, good air exchange, clear to auscultation bilaterally, no crackles or wheezing  Heart:  regular rate and rhythm and no murmur    Abdomen:  soft, gravid, non-tender, no right upper quadrant tenderness, no CVA tenderness, uterus non-tender, no signs of abruption and no signs of chorioamnionitis  Extremities:  no calf tenderness, non edematous, DTRs: normal    Pelvic Exam:  Cervix Check: 2 cm dilated, 60 % effaced, -1 station, posterior position, medium consistency, Cephalic          0 1 2 3   Position Posterior Intermediate Anterior -   Consistency Firm Intermediate Soft -   Effacement 0-30% 31-50% 51-80% >80%   Dilation 0cm 1-2cm 3-4cm >5cm   Fetal Station -3 -2 -1, 0 +1, +2        LIMITED BEDSIDE US:  Position: Cephalic  Placental Location: anterior  Fetal Heart Tones: Present  Fetal Movement: Present  Amniotic Fluid Index/Volume: adequate 2x2 cm fluid pocket  Estimated Fetal Weight:  7 lbs 15oz    PRENATAL LAB RESULTS:   Blood Type/Rh: O pos  Antibody Screen: negative  Hemoglobin, Hematocrit, Platelets: 24.4 / 77.2 / 254  Rubella: immune  T.  Pallidum, IgG: non-reactive   Hepatitis B Surface Antigen: non-reactive   HIV: non-reactive   Sickle Cell Screen: not available   Gonorrhea: negative  Chlamydia: negative  Urine culture: negative, date: 1/15/19    1 hour Glucose Tolerance Test: 149  3 hour Glucose Tolerance Test: Fastin: 1 hour: 169; 2 hour  113; 3 hour: 60    Group B Strep: negative  Cystic Fibrosis Screen: negative  First Trimester Screen: not available  MSAFP/Multiple Markers: not available  Non-Invasive Prenatal Testing: declined   Anatomy US: anterior, 3VC, male     ASSESSMENT & PLAN:  Irving Gomez is a 37 y.o. female  at 40w1d IUP   - GBS negative / Rh necessary. Even with all of these increased risks, with the advancement in medical procedures and testing, there are several ways to reduce your risk. They include early and regular prenatal care, taking the multivitamin with folic acid prescribed by your health care manager, beginning pregnancy at a healthy weight, not smoking or drinking alcoholic beverages, and eating healthy foods. There are tests that all pregnant women are encouraged to take, but there is additional prenatal testing that is regularly offered to any woman 28 or older because of the potential of increased risks to the mother and baby. These tests are chorionic villus sampling (CVS) and amniocentesis. Claudiadebonaresh Linares is also available which is a non-invasive option for fetal karyotyping. With CVS, a small sample of cells (called chorionic villi) is taken from the placenta where it attached to the wall of the uterus. Chorionic villi are tiny parts of the placenta; therefore they have the same genes as the baby. This test is 98% accurate, but can carry a slightly higher risk of miscarriage than amniocentesis, since the procedure is done in early pregnancy. Amniocentesis is a procedure where a sample of fluid is removed from the amniotic sac for analysis and evaluation. During this procedure, fluid is removed by placing a long needle through the abdominal wall into the amniotic sac. The amniocentesis needle is typically guided into the sac with the help of ultrasound imaging. Once the needle is in the sac, a syringe is used to withdraw the clear taryn-colored amniotic fluid, which looks a bit like urine. NIPT, utilizes the maternal blood to test for fetal cells. These fetal cells are then karyotyped for genetic evaluation. All of these tests, CVS, NIPT and amniocentesis, let couples know if the fetus will have genetic abnormalities, and will help them make informed decisions regarding their pregnancy.  Karyotyping by either CVS, NIPT or Amniocentesis

## 2019-04-10 NOTE — FLOWSHEET NOTE
Education information given to mother and she verbalizes understanding about the following:  Understanding your baby's  screening tests pamphlet. Hour for [employer]. Patient Safety Education. Infant security including the four band system and the HUGS system. Skin to Skin Contact for You and Your Baby. Benefits of breastfeeding. QR codes for videos online including: Breastfeeding Massage/Hand Express, Breastfeeding Positions, and Breastfeeding latch. Risks of formula given and discussed with mother. What do the experts say about the use of pacifiers/supplementation of a  infant? Safe sleep for your baby (supplied by [place])    Mother encouraged to review pamphlets and watch videos (if able).      Mother chooses to breastfeed

## 2019-04-11 PROCEDURE — 3E033VJ INTRODUCTION OF OTHER HORMONE INTO PERIPHERAL VEIN, PERCUTANEOUS APPROACH: ICD-10-PCS | Performed by: OBSTETRICS & GYNECOLOGY

## 2019-04-11 PROCEDURE — 7200000001 HC VAGINAL DELIVERY

## 2019-04-11 PROCEDURE — 88307 TISSUE EXAM BY PATHOLOGIST: CPT

## 2019-04-11 PROCEDURE — 6370000000 HC RX 637 (ALT 250 FOR IP): Performed by: STUDENT IN AN ORGANIZED HEALTH CARE EDUCATION/TRAINING PROGRAM

## 2019-04-11 PROCEDURE — 59400 OBSTETRICAL CARE: CPT | Performed by: OBSTETRICS & GYNECOLOGY

## 2019-04-11 PROCEDURE — 3E0P7VZ INTRODUCTION OF HORMONE INTO FEMALE REPRODUCTIVE, VIA NATURAL OR ARTIFICIAL OPENING: ICD-10-PCS | Performed by: OBSTETRICS & GYNECOLOGY

## 2019-04-11 PROCEDURE — 10907ZC DRAINAGE OF AMNIOTIC FLUID, THERAPEUTIC FROM PRODUCTS OF CONCEPTION, VIA NATURAL OR ARTIFICIAL OPENING: ICD-10-PCS | Performed by: OBSTETRICS & GYNECOLOGY

## 2019-04-11 PROCEDURE — 1220000000 HC SEMI PRIVATE OB R&B

## 2019-04-11 PROCEDURE — 10H07YZ INSERTION OF OTHER DEVICE INTO PRODUCTS OF CONCEPTION, VIA NATURAL OR ARTIFICIAL OPENING: ICD-10-PCS | Performed by: OBSTETRICS & GYNECOLOGY

## 2019-04-11 RX ORDER — ONDANSETRON HYDROCHLORIDE 8 MG/1
8 TABLET, FILM COATED ORAL EVERY 8 HOURS PRN
Status: DISCONTINUED | OUTPATIENT
Start: 2019-04-11 | End: 2019-04-12 | Stop reason: HOSPADM

## 2019-04-11 RX ORDER — LANOLIN 100 %
OINTMENT (GRAM) TOPICAL PRN
Status: DISCONTINUED | OUTPATIENT
Start: 2019-04-11 | End: 2019-04-12 | Stop reason: HOSPADM

## 2019-04-11 RX ORDER — SODIUM CHLORIDE 0.9 % (FLUSH) 0.9 %
10 SYRINGE (ML) INJECTION EVERY 12 HOURS SCHEDULED
Status: DISCONTINUED | OUTPATIENT
Start: 2019-04-11 | End: 2019-04-12 | Stop reason: HOSPADM

## 2019-04-11 RX ORDER — ACETAMINOPHEN 325 MG/1
650 TABLET ORAL EVERY 4 HOURS PRN
Status: DISCONTINUED | OUTPATIENT
Start: 2019-04-11 | End: 2019-04-12 | Stop reason: HOSPADM

## 2019-04-11 RX ORDER — IBUPROFEN 800 MG/1
800 TABLET ORAL EVERY 8 HOURS PRN
Status: DISCONTINUED | OUTPATIENT
Start: 2019-04-11 | End: 2019-04-12 | Stop reason: HOSPADM

## 2019-04-11 RX ORDER — SIMETHICONE 80 MG
80 TABLET,CHEWABLE ORAL EVERY 6 HOURS PRN
Status: DISCONTINUED | OUTPATIENT
Start: 2019-04-11 | End: 2019-04-12 | Stop reason: HOSPADM

## 2019-04-11 RX ORDER — POLYETHYLENE GLYCOL 3350 17 G/17G
17 POWDER, FOR SOLUTION ORAL DAILY PRN
Status: DISCONTINUED | OUTPATIENT
Start: 2019-04-11 | End: 2019-04-12 | Stop reason: HOSPADM

## 2019-04-11 RX ORDER — BISACODYL 10 MG
10 SUPPOSITORY, RECTAL RECTAL DAILY PRN
Status: DISCONTINUED | OUTPATIENT
Start: 2019-04-11 | End: 2019-04-12 | Stop reason: HOSPADM

## 2019-04-11 RX ORDER — DOCUSATE SODIUM 100 MG/1
100 CAPSULE, LIQUID FILLED ORAL 2 TIMES DAILY PRN
Status: DISCONTINUED | OUTPATIENT
Start: 2019-04-11 | End: 2019-04-12 | Stop reason: HOSPADM

## 2019-04-11 RX ORDER — SODIUM CHLORIDE 0.9 % (FLUSH) 0.9 %
10 SYRINGE (ML) INJECTION PRN
Status: DISCONTINUED | OUTPATIENT
Start: 2019-04-11 | End: 2019-04-12 | Stop reason: HOSPADM

## 2019-04-11 RX ADMIN — BENZOCAINE AND LEVOMENTHOL: 200; 5 SPRAY TOPICAL at 04:29

## 2019-04-11 RX ADMIN — IBUPROFEN 800 MG: 800 TABLET ORAL at 21:09

## 2019-04-11 RX ADMIN — IBUPROFEN 800 MG: 800 TABLET ORAL at 04:28

## 2019-04-11 RX ADMIN — DOCUSATE SODIUM 100 MG: 100 CAPSULE, LIQUID FILLED ORAL at 21:09

## 2019-04-11 RX ADMIN — IBUPROFEN 800 MG: 800 TABLET ORAL at 13:09

## 2019-04-11 ASSESSMENT — PAIN DESCRIPTION - DESCRIPTORS
DESCRIPTORS: CRAMPING
DESCRIPTORS: CRAMPING

## 2019-04-11 ASSESSMENT — PAIN - FUNCTIONAL ASSESSMENT: PAIN_FUNCTIONAL_ASSESSMENT: ACTIVITIES ARE NOT PREVENTED

## 2019-04-11 ASSESSMENT — PAIN DESCRIPTION - PROGRESSION: CLINICAL_PROGRESSION: RAPIDLY WORSENING

## 2019-04-11 ASSESSMENT — PAIN DESCRIPTION - LOCATION
LOCATION: ABDOMEN
LOCATION: ABDOMEN

## 2019-04-11 ASSESSMENT — PAIN SCALES - GENERAL
PAINLEVEL_OUTOF10: 1
PAINLEVEL_OUTOF10: 4
PAINLEVEL_OUTOF10: 1
PAINLEVEL_OUTOF10: 6

## 2019-04-11 ASSESSMENT — PAIN DESCRIPTION - ORIENTATION: ORIENTATION: LOWER

## 2019-04-11 ASSESSMENT — PAIN DESCRIPTION - FREQUENCY
FREQUENCY: INTERMITTENT
FREQUENCY: INTERMITTENT

## 2019-04-11 ASSESSMENT — PAIN DESCRIPTION - PAIN TYPE: TYPE: ACUTE PAIN

## 2019-04-11 NOTE — ANESTHESIA PROCEDURE NOTES
Epidural Block    Patient location during procedure: OB  Start time: 4/10/2019 8:35 PM  End time: 4/10/2019 8:59 PM  Reason for block: labor epidural  Staffing  Anesthesiologist: Isrrael Prieto MD  Performed: anesthesiologist   Preanesthetic Checklist  Completed: patient identified, site marked, surgical consent, pre-op evaluation, timeout performed, IV checked, risks and benefits discussed, monitors and equipment checked, anesthesia consent given, oxygen available and patient being monitored  Epidural  Patient position: sitting  Prep: Betadine  Patient monitoring: continuous pulse ox and frequent blood pressure checks  Approach: midline  Location: lumbar (1-5)  Injection technique: SALVADOR air and SALVADOR saline  Provider prep: mask and sterile gloves  Needle  Needle type: Tuohy   Needle gauge: 17 G  Needle length: 3.5 in  Needle insertion depth: 7 cm  Catheter type: end hole  Catheter size: 19 G  Catheter at skin depth: 12 cm  Test dose: negative  Assessment  Hemodynamics: stable  Attempts: 1

## 2019-04-11 NOTE — PROGRESS NOTES
Labor Progress Note    Adam Lin is a 37 y.o. female  at 44w3d  The patient was seen and examined. Her pain is under control with epidural in place. AROM performed with clear fluid and IUPC placed without difficulty. Patient tolerated procedure well. Vital Signs:  Vitals:    04/10/19 2057 04/10/19 2144 04/10/19 2215 04/10/19 2246   BP: 138/65 118/64 116/64 115/61   Pulse: 72 67 74 64   Resp:  16     Temp:       TempSrc:       SpO2:       Weight:       Height:           FHT: 130, moderate variability, accelerations present, decelerations absent  Contractions: regular, every 3 minutes  Cervical Exam: 4 cm dilated, 60 effaced, 0 station  Pitocin: @ 4 mu/min    Membranes: Intact  Scalp Electrode in place: absent  Intrauterine Pressure Catheter in Place: absent    Assessment/Plan:  Adam Lin is a 37 y.o. female  at 40w1d for elective IOL   - GBS negative, No indication for GBS prophylaxis   - s/p cervidil    - epidural in place   - pitocin per protocol   - AROM, clear fluid, IUPC placed @ 2310   - continue expectant management     Plan discussed with Dr. Will Lux, who is agreeable.      Michael Coreas DO  Ob/Gyn Resident  4/10/2019, 11:17 PM

## 2019-04-11 NOTE — L&D DELIVERY NOTE
Mother's Information    Labor Events     labor?:  No  Rupture type:  Artificial=AROM  Fluid color:  Clear  Fluid odor:  None     Mother Delivery Information    Episiotomy:  Right Mediolateral  Lacerations:  None  # of Repair Packets:  1  Vaginal Delivery Est. Blood Loss (mL):  200  Surgical or Additional Est. Blood Loss (mL):  0 (View Only):  Edit in Flowsheets   Combined Est. Blood Loss (mL):  200        Anna Moralez [[id number]]    Labor Events     labor?:  No   steroids?:  None  Cervical ripening date/time:     Rupture Identifier:  Sac 1   Rupture date/time: 4/10/19 23:06:00   Rupture type:  Artificial=AROM  Fluid color:  Clear  Fluid odor:  None  Induction:  Cervidil  Indications for induction:  Post-term Gestation  Augmentation:  Oxytocin  Indications for augmentation:  Ineffective Contraction Pattern  Labor complications:  None          Labor Event Times    Labor onset date/time:     Dilation complete date/time:   19   Start pushin2019 0249   Decision time (emergent ):        Anesthesia    Method:  Epidural     Assisted Delivery Details    Forceps attempted?:  No  Vacuum extractor attempted?:  No     Document Additional Attempt       Document Additional Attempt             Shoulder Dystocia    Shoulder dystocia present?:  No  Add Second Maneuver  Add Third Maneuver  Add Fourth Maneuver  Add Fifth Maneuver  Add Sixth Maneuver  Add Seventh Maneuver  Add Eighth Maneuver  Add Ninth Maneuver     [place] Information    Head delivery date/time:  2019 02:56:00   Changing the 's delivery date/time could affect patient care.:     Delivery date/time:   19 0256   Delivery type:  Vaginal, Spontaneous    Details:         Delivery Providers    Delivering clinician:  Yeni Villela DO   Provider Role    Tina Wilson DO Resident    Lawyer Edwina RN Registered Nurse    Caden Mosher RN Registered Nurse    Judge Salazar Dl Surgical Tech      Cord    Vessels:  3 Vessels  Complications:  None  Delayed cord clamping?:  Yes  Cord clamped date/time:  2019 0257  Cord blood disposition:  Lab  Gases sent?:  Yes  Stem cell collection (by provider): No     Placenta    Date/time:  2019 03:01:00  Removal:  Spontaneous  Appearance:  Intact  Disposition:  Pathology     Delivery Resuscitation    Method:  Bulb Suction, Stimulation     Apgars    Living status:  Living  Apgars   1 Minute:   5 Minute:   10 Minute 15 Minute 20 Minute   Skin Color: 0  1       Heart Rate: 2  2       Reflex Irritability: 2  2       Muscle Tone: 2  2       Respiratory Effort: 2  2       Total: 8  9               Apgars Assigned ByFrank Noyola     [place] Measurements       Delivery Information    Episiotomy:  Right Mediolateral  Indications for episiotomy:  Other  Perineal lacerations:  None    Vaginal laceration:  No    Cervical laceration:  No    Vaginal delivery est. blood loss (mL):  200  Surgical or additional est. blood loss (mL):  0 (View Only):  Edit in Flowsheets   Combined est. blood loss (mL):  200     Vaginal Delivery Counts    Initial count personnel:  ANDREW  Initial count verified by:  Johnson Duran   4x4:   Needles:   Instruments:   Lap Pads:   Sponges:     Initial counts:          Final counts:          Final count personnel:  ANDREW  Final count verified by:  Johnson Duran  Accurate final count?:  Yes  Final vaginal sweep completed:   Yes            Department of Obstetrics and Gynecology  Spontaneous Vaginal Delivery Note        Patient Name: Jefe Rand  Patient : 1975  Room/Bed: 1749/7086-24  Admission Date/Time: 4/10/2019 12:20 PM  MRN #: [id number]  [facility] #: [id number]          Date: 2019  Time: 3:13 AM    Pre-operative Diagnosis:   Jefe Rand is a 37 y.o. female at 41w4d    Term pregnancy, Induced labor, Single fetus and Pregnancy complicated by: SEE PROBLEM LIST  Patient Active Problem List    Diagnosis Date Noted    testing that is regularly offered to any woman 28 or older because of the potential of increased risks to the mother and baby. These tests are chorionic villus sampling (CVS) and amniocentesis. Tracey Ruvalcabar is also available which is a non-invasive option for fetal karyotyping. With CVS, a small sample of cells (called chorionic villi) is taken from the placenta where it attached to the wall of the uterus. Chorionic villi are tiny parts of the placenta; therefore they have the same genes as the baby. This test is 98% accurate, but can carry a slightly higher risk of miscarriage than amniocentesis, since the procedure is done in early pregnancy. Amniocentesis is a procedure where a sample of fluid is removed from the amniotic sac for analysis and evaluation. During this procedure, fluid is removed by placing a long needle through the abdominal wall into the amniotic sac. The amniocentesis needle is typically guided into the sac with the help of ultrasound imaging. Once the needle is in the sac, a syringe is used to withdraw the clear taryn-colored amniotic fluid, which looks a bit like urine. NIPT, utilizes the maternal blood to test for fetal cells. These fetal cells are then karyotyped for genetic evaluation. All of these tests, CVS, NIPT and amniocentesis, let couples know if the fetus will have genetic abnormalities, and will help them make informed decisions regarding their pregnancy. Karyotyping by either CVS, NIPT or Amniocentesis is the ONLY way to confirm the genetic chromosomal structure regarding trisomy; Down's Syndrome, Edward's or Pateau's. [facility] was reviewed. If NIPT is positive then a confirmatory amniocentesis would be recommended to confirm the diagnosis.         FH: Down syndrome 03/03/2014     Priority: High     Overview Note:     FOB has cousins with down syndrome x 2  10/23/2018 Declines invasive and noninvasive prenatal testing      Narcolepsy      Priority: High     Overview Note:     Pt Method:  Epidural     Assisted Delivery Details    Forceps attempted?:  No  Vacuum extractor attempted?:  No     Document Additional Attempt       Document Additional Attempt             Shoulder Dystocia    Shoulder dystocia present?:  No  Add Second Maneuver  Add Third Maneuver  Add Fourth Maneuver  Add Fifth Maneuver  Add Sixth Maneuver  Add Seventh Maneuver  Add Eighth Maneuver  Add Ninth Maneuver      Information    Head delivery date/time:  2019 02:56:00   Changing the 's delivery date/time could affect patient care.:     Delivery date/time:   19 0256   Delivery type:  Vaginal, Spontaneous    Details:         Delivery Providers    Delivering clinician:  Cassia Mays DO   Provider Role    Aracely Ho DO Resident    Ellen Whitt, RN Registered Nurse    Genesis Richardson, RN Registered Nurse    Salazar Mu Surgical Tech      Cord    Vessels:  3 Vessels  Complications:  None  Delayed cord clamping?:  Yes  Cord clamped date/time:  2019 025  Cord blood disposition:  Lab  Gases sent?:  Yes  Stem cell collection (by provider):   No     Placenta    Date/time:  2019 03:01:00  Removal:  Spontaneous  Appearance:  Intact  Disposition:  Pathology     Delivery Resuscitation    Method:  Bulb Suction, Stimulation     Apgars    Living status:  Living  Apgars   1 Minute:   5 Minute:   10 Minute 15 Minute 20 Minute   Skin Color: 0  1       Heart Rate: 2  2       Reflex Irritability: 2  2       Muscle Tone: 2  2       Respiratory Effort: 2  2       Total: 8  9               Apgars Assigned Sintia Guan      Measurements       Delivery Information    Episiotomy:  Right Mediolateral  Indications for episiotomy:  Other  Perineal lacerations:  None    Vaginal laceration:  No    Cervical laceration:  No    Vaginal delivery est. blood loss (mL):  200  Surgical or additional est. blood loss (mL):  0 (View Only):  Edit in Flowsheets   Combined est. blood loss (mL):  200     Vaginal Delivery Counts    Initial count personnel:  ANDREW  Initial count verified by:  Denae Raya   4x4:   Needles:   Instruments:   Lap Pads:   Sponges:     Initial counts:          Final counts:          Final count personnel:  ANDREW  Final count verified by:  Denae Raya  Accurate final count?:  Yes  Final vaginal sweep completed: Yes            Living  infant(s) and Male    Cephalic  left occiput anterior  Other:       Amniotic Fluid was: Clear  A Nuchal Cord: was not present x 0  A Spontaneous Cry Was Noted: Yes  The Baby: was suctioned        The Placenta Was Removed:  intact, whole and that the umbilical cord had three vessels noted    cord gasses were obtained and sent to the lab, cord blood was obtained and sent to the lab and Pitocin, 20 milliunits in 1 liter of ringers lactate was administered, wide open, to assist with uterine contraction    The umbilical cord had delayed clamping of 1 minute: Yes      Episiotomy: (lateralizing to right)  Second degree episiotomy. Suture used for repair:  Vicryl 3.0. The Cervix Vagina & Rectum were inspected after the repair and found to be intact without any defects. Good sphincter tone was present. Yes      Condition:  infant stable to general nursery and mother stable    Blood Type and Rh:   ABO/Rh   Date Value Ref Range Status   04/10/2019 O POSITIVE  Final         Rubella Immunity Status:     Rubella Antibody, IGG   Date Value Ref Range Status   09/10/2018 61.0 IU/mL Final     Comment:                 REFERENCE RANGE:  <5.0       NON-REACTIVE (non-immune)  5.0 TO 9.9 EQUIVOCAL  >=10.0     REACTIVE     (immune)                     Infant Feeding:    breast    Circumcision Requested: Yes      Attending Attestation: I was present and scrubbed for the entire procedure.       A Vaginal Vault Sweep Was Completed-All Sponge Counts Were Correct: Yes          Resident Name: Fred Briggs D.O. PGY3    Attending Name: Michele Machado DO      Electronically signed by Roger Preston DO on 4/11/2019 at 3:14 AM

## 2019-04-11 NOTE — FLOWSHEET NOTE
Everyone in room is wearing a mask. 2035 -  Dr. Loomis Leak at bedside and consent form signed. Risks and benefits discussed and patient verbalizes understanding. Patient verbalizes to proceed with procedure. Time out performed. 2033 - to dangle position. 2037  - procedure started. 2045  - test dose given. Patient tolerated procedure well. 2050 -to low fowlers position with hip wedge in place. 2051  -first dose given. 2055 - epidural pump started. See anesthesia note.

## 2019-04-11 NOTE — ANESTHESIA PRE PROCEDURE
Date    BREAST BIOPSY Left 12/2016       Social History:    Social History     Tobacco Use    Smoking status: Never Smoker    Smokeless tobacco: Never Used   Substance Use Topics    Alcohol use: No                                Counseling given: Not Answered      Vital Signs (Current):   Vitals:    04/10/19 1244 04/10/19 1429 04/10/19 1645 04/10/19 1902   BP:  127/62 123/65 136/68   Pulse:  72 67 71   Resp:  16 16 16   Temp:  98.6 °F (37 °C) 99.2 °F (37.3 °C) 98.5 °F (36.9 °C)   TempSrc:  Infrared Infrared Infrared   SpO2: 97%      Weight: 224 lb (101.6 kg)      Height: 5' 6\" (1.676 m)                                                 BP Readings from Last 3 Encounters:   04/10/19 136/68   04/09/19 104/68   04/04/19 120/74       NPO Status: Time of last liquid consumption: 1100                        Time of last solid consumption: 1100                        Date of last liquid consumption: 04/10/19                        Date of last solid food consumption: 04/10/19    BMI:   Wt Readings from Last 3 Encounters:   04/10/19 224 lb (101.6 kg)   04/09/19 224 lb (101.6 kg)   04/04/19 224 lb (101.6 kg)     Body mass index is 36.15 kg/m². CBC:   Lab Results   Component Value Date    WBC 9.8 04/10/2019    RBC 4.31 04/10/2019    HGB 13.5 04/10/2019    HCT 38.8 04/10/2019    MCV 90.1 04/10/2019    RDW 13.8 04/10/2019     04/10/2019     03/05/2014       CMP:   Lab Results   Component Value Date    GLUCOSE 149 01/15/2019    GLUCOSE 78 09/10/2018       POC Tests: No results for input(s): POCGLU, POCNA, POCK, POCCL, POCBUN, POCHEMO, POCHCT in the last 72 hours.     Coags: No results found for: PROTIME, INR, APTT    HCG (If Applicable):   Lab Results   Component Value Date    PREGTESTUR positive 09/10/2018    HCGQUANT 148,473 (H) 09/10/2018        ABGs: No results found for: PHART, PO2ART, SDA8UHR, XLM9XJQ, BEART, F0TNTCPD     Type & Screen (If Applicable):  No results found for: LABABO, [address]    Anesthesia Evaluation  Patient summary reviewed and Nursing notes reviewed  Airway: Mallampati: II  TM distance: >3 FB   Neck ROM: full  Mouth opening: > = 3 FB Dental: normal exam         Pulmonary:normal exam                               Cardiovascular:Negative CV ROS            Rhythm: regular  Rate: normal                    Neuro/Psych:   (+) headaches: migraine headaches,             GI/Hepatic/Renal: Neg GI/Hepatic/Renal ROS            Endo/Other: Negative Endo/Other ROS                    Abdominal:           Vascular:                                        Anesthesia Plan      epidural     ASA 2             Anesthetic plan and risks discussed with patient.                       Shanti Servin MD   4/10/2019

## 2019-04-11 NOTE — ANESTHESIA POSTPROCEDURE EVALUATION
POST- ANESTHESIA EVALUATION       Pt Name: Aida Rendon  MRN: [id number]  YOB: 1975  Date of evaluation: 4/11/2019  Time:  6:38 AM      /69   Pulse 61   Temp 98.8 °F (37.1 °C) (Infrared)   Resp 16   Ht 5' 6\" (1.676 m)   Wt 224 lb (101.6 kg)   LMP 07/10/2018 (Approximate)   SpO2 97%   Breastfeeding? Unknown   BMI 36.15 kg/m²      Consciousness Level  Awake  Cardiopulmonary Status  Stable  Pain Adequately Treated YES  Nausea / Vomiting  NO  Adequate Hydration  YES  Anesthesia Related Complications NONE      Electronically signed by Perry Metzger MD on 4/11/2019 at 6:38 AM       Department of Anesthesiology  Postprocedure Note    Patient: Aida Rendon  MRN: [id number]  [place]: 1975  Date of evaluation: 4/11/2019  Time:  6:38 AM     Procedure Summary     Date:  04/10/19 Room / Location:      Anesthesia Start:  2058 Anesthesia Stop:  04/11/19 0256    Procedure:  ANESTHESIA LABOR ANALGESIA Diagnosis:      Scheduled Providers:   Responsible Provider:  Perry Metzger MD    Anesthesia Type:  epidural ASA Status:  2          Anesthesia Type: epidural    Miguel Phase I: Miguel Score: 10    Miguel Phase II: Miguel Score: 10    Last vitals: Reviewed and per EMR flowsheets.        Anesthesia Post Evaluation

## 2019-04-11 NOTE — PROGRESS NOTES
Labor Progress Note    Natalie Griggs is a 37 y.o. female  at 44w3d  The patient was seen and examined. Her pain is not well controlled. Patient requesting epidural at this time. Cervidil pulled. Vital Signs:  Vitals:    04/10/19 1244 04/10/19 1429 04/10/19 1645 04/10/19 1902   BP:  127/62 123/65 136/68   Pulse:  72 67 71   Resp:  16 16 16   Temp:  98.6 °F (37 °C) 99.2 °F (37.3 °C) 98.5 °F (36.9 °C)   TempSrc:  Infrared Infrared Infrared   SpO2: 97%      Weight: 224 lb (101.6 kg)      Height: 5' 6\" (1.676 m)          FHT: 140, moderate variability, accelerations present, decelerations absent  Contractions: regular, every 3 minutes  Cervical Exam: 4 cm dilated, 60 effaced, -1 station  Pitocin: @ 0 mu/min    Membranes: Intact  Scalp Electrode in place: absent  Intrauterine Pressure Catheter in Place: absent    Assessment/Plan:  Natalie Griggs is a 37 y.o. female  at 44w3d for elective IOL   - GBS negative, No indication for GBS prophylaxis   - s/p cervidil    - epidural ordered   - will start pitocin as needed    Plan discussed with Dr. Chung Smith, who is agreeable.      Ye Lala DO  Ob/Gyn Resident  4/10/2019, 8:31 PM

## 2019-04-12 VITALS
SYSTOLIC BLOOD PRESSURE: 122 MMHG | DIASTOLIC BLOOD PRESSURE: 57 MMHG | RESPIRATION RATE: 16 BRPM | BODY MASS INDEX: 36 KG/M2 | WEIGHT: 224 LBS | HEIGHT: 66 IN | HEART RATE: 75 BPM | OXYGEN SATURATION: 97 % | TEMPERATURE: 98.8 F

## 2019-04-12 PROBLEM — O09.90 HRP (HIGH RISK PREGNANCY): Status: ACTIVE | Noted: 2019-04-12

## 2019-04-12 LAB
HCT VFR BLD CALC: 33.6 % (ref 36–46)
HEMOGLOBIN: 11.5 G/DL (ref 12–16)

## 2019-04-12 PROCEDURE — 85018 HEMOGLOBIN: CPT

## 2019-04-12 PROCEDURE — 36415 COLL VENOUS BLD VENIPUNCTURE: CPT

## 2019-04-12 PROCEDURE — 6370000000 HC RX 637 (ALT 250 FOR IP): Performed by: STUDENT IN AN ORGANIZED HEALTH CARE EDUCATION/TRAINING PROGRAM

## 2019-04-12 PROCEDURE — 85014 HEMATOCRIT: CPT

## 2019-04-12 RX ORDER — PSEUDOEPHEDRINE HCL 30 MG
100 TABLET ORAL 2 TIMES DAILY PRN
Qty: 60 CAPSULE | Refills: 1 | Status: SHIPPED | OUTPATIENT
Start: 2019-04-12 | End: 2019-09-27

## 2019-04-12 RX ORDER — IBUPROFEN 600 MG/1
600 TABLET ORAL EVERY 6 HOURS PRN
Qty: 60 TABLET | Refills: 1 | Status: SHIPPED | OUTPATIENT
Start: 2019-04-12 | End: 2019-09-27

## 2019-04-12 RX ADMIN — IBUPROFEN 800 MG: 800 TABLET ORAL at 15:33

## 2019-04-12 RX ADMIN — MAGNESIUM HYDROXIDE 30 ML: 400 SUSPENSION ORAL at 15:33

## 2019-04-12 RX ADMIN — DOCUSATE SODIUM 100 MG: 100 CAPSULE, LIQUID FILLED ORAL at 15:33

## 2019-04-12 RX ADMIN — IBUPROFEN 800 MG: 800 TABLET ORAL at 05:45

## 2019-04-12 ASSESSMENT — PAIN SCALES - GENERAL
PAINLEVEL_OUTOF10: 1
PAINLEVEL_OUTOF10: 0

## 2019-04-12 ASSESSMENT — PAIN DESCRIPTION - PAIN TYPE: TYPE: ACUTE PAIN

## 2019-04-12 ASSESSMENT — PAIN DESCRIPTION - FREQUENCY: FREQUENCY: INTERMITTENT

## 2019-04-12 ASSESSMENT — PAIN DESCRIPTION - ORIENTATION: ORIENTATION: LOWER

## 2019-04-12 ASSESSMENT — PAIN DESCRIPTION - DESCRIPTORS: DESCRIPTORS: CRAMPING

## 2019-04-12 ASSESSMENT — PAIN DESCRIPTION - LOCATION: LOCATION: ABDOMEN

## 2019-04-12 NOTE — LACTATION NOTE
This note was copied from a baby's chart. Baby nursing well  x 2  Last night and x1 this morning. Baby stooling  as expected for age but has not voided yet. . Mother relates breastfeeding is going well so far. Encouraged to notify me if she has any breastfeeding questions or problems after discharge. Handouts given-  Breastfeeding Resource Guide including links to  video clips on:  (Hand expression , Breast feeding Positions & Latch ),Breastfeeding feeding Norms the first 3 days of life,Feeding Diary,  ILCA Managing Your Milk Supply: Going with the Flow, Feeding Cues, Second Night,USDA Tips for Breastfeeding Moms, Best Start- Mixing Alcohol and Breastfeeding ,Handout from the [employer] You Need to Know About Marijuana Use and Pregnancy\" given to mother. [employer]- Milk Hand Expresssion and Pumping handouts given with demonstration of hand expression and Signs of a Good Feeding handout. Discussed handouts with mother verbalizing understanding and encouraged mother  to view video clips. Mother is exhausted at present time and wanting to nap.  Written information given and will be reinforced at later time due to mother's request for rest.
Breastfeeding and Mothers's milk reference and information from [address] given also.

## 2019-04-12 NOTE — PROGRESS NOTES
CLINICAL PHARMACY NOTE: MEDS TO [address] Select Patient?: Yes  Total # of Prescriptions Filled: 1   The following medications were delivered to the patient:  · Ibuprofen  ·   Total # of Interventions Completed: 0  Time Spent (min): 30    Additional Documentation:

## 2019-04-12 NOTE — PROGRESS NOTES
POST PARTUM DAY # 1    Munira Garnett is a 37 y.o. female  This patient was seen & examined today. Her pregnancy was complicated by:   Patient Active Problem List   Diagnosis    Narcolepsy    Migraine    Elderly multigravida in first trimester    FH: Down syndrome    Hx of abnormal Pap smear    H/O migraine during pregnancy    Choroid plexus cysts, fetal, affecting care of mother, antepartum    Abnormal glucose tolerance test (GTT) during pregnancy, antepartum    Elderly multigravida in third trimester     19 M Apg 8/9 Wt 8#6    Encounter for induction of labor    HRP (high risk pregnancy)       Today she is doing well without any chief complaint. Her lochia is light. She denies chest pain, shortness of breath, headache, lightheadedness, blurred vision, peripheral edema, palpitations, dry cough and fatigue. She is  breast feeding and she denies any breast tenderness. She is ambulating well. Her voiding pattern is normal. I reviewed signs and symptoms of post partum depression with the patient, she currently denies any of these symptoms. She is tolerating solids.      Vital Signs:  Vitals:    19 0725 19 1312 19 2033 19 0113   BP: 126/75 (!) 149/63 (!) 108/59 (!) 116/56   Pulse: 60 66 70 72   Resp: 16 16 16 16   Temp: 98.3 °F (36.8 °C) 98.3 °F (36.8 °C) 98.9 °F (37.2 °C) 98.2 °F (36.8 °C)   TempSrc: Infrared Infrared Infrared Oral   SpO2:       Weight:       Height:             Physical Exam:  General:  no apparent distress, alert and cooperative  Neurologic:  alert, oriented, normal speech, no focal findings or movement disorder noted  Lungs:  No increased work of breathing, good air exchange, clear to auscultation bilaterally, no crackles or wheezing  Heart:  regular rate and rhythm    Abdomen: abdomen soft, non-distended, non-tender  Fundus: non-tender, normal size, firm, below umbilicus  Extremities:  no calf tenderness, non edematous    Lab:  Lab Results   Component Value Date    HGB 11.5 (L) 2019     Lab Results   Component Value Date    HCT 33.6 (L) 2019       Assessment/Plan:  1. Ekta Don is a [id number] PPD # 1 s/p    - Doing well, VSS   - male infant in [address], circumcision desired   - Encourage ambulation   - Postpartum Hgb/Hct completed  2. Rh positive/Rubella immune  3. Breast feeding    - Denies s/s mastitis   4. AMA (44yo)  5. Hx migraines   6. Narcolepsy   - Continue home meds   7. Continue post partum care    Counseling Completed:  Secondary Smoke risks and Sudden Infant Death Syndrome were reviewed with recommendations. Infant sleeping, \"back to sleep\" and avoidance of co-sleeping recommendations were reviewed. Signs and Symptoms of Post Partum Depression were reviewed. The patient is to call if any occur. Signs and symptoms of Mastitis were reviewed. The patient is to call if any occur for follow up.   Discharge instructions including pelvic rest, no driving with pain medicine and office follow-up were reviewed with patient     Provider's Name: Dr. Raphael Shell  Ob/Gyn Resident   2019, 8:10 AM

## 2019-04-15 LAB — SURGICAL PATHOLOGY REPORT: NORMAL

## 2019-04-16 ENCOUNTER — TELEPHONE (OUTPATIENT)
Dept: OBGYN CLINIC | Age: 44
End: 2019-04-16

## 2019-04-16 PROBLEM — O35.03X0 CHOROID PLEXUS CYSTS, FETAL, AFFECTING CARE OF MOTHER, ANTEPARTUM: Status: RESOLVED | Noted: 2018-11-27 | Resolved: 2019-04-16

## 2019-04-16 PROBLEM — Z86.69 H/O MIGRAINE DURING PREGNANCY: Status: RESOLVED | Noted: 2018-10-23 | Resolved: 2019-04-16

## 2019-04-16 PROBLEM — O99.810 ABNORMAL GLUCOSE TOLERANCE TEST (GTT) DURING PREGNANCY, ANTEPARTUM: Status: RESOLVED | Noted: 2019-01-15 | Resolved: 2019-04-16

## 2019-04-16 PROBLEM — Z87.59 H/O MIGRAINE DURING PREGNANCY: Status: RESOLVED | Noted: 2018-10-23 | Resolved: 2019-04-16

## 2019-04-17 ENCOUNTER — HOSPITAL ENCOUNTER (EMERGENCY)
Age: 44
Discharge: HOME | End: 2019-04-17
Attending: EMERGENCY MEDICINE
Payer: COMMERCIAL

## 2019-04-17 ENCOUNTER — OFFICE VISIT (OUTPATIENT)
Dept: OBGYN CLINIC | Age: 44
End: 2019-04-17

## 2019-04-17 ENCOUNTER — HOSPITAL ENCOUNTER (OUTPATIENT)
Dept: VASCULAR LAB | Age: 44
Discharge: HOME | End: 2019-04-17
Payer: COMMERCIAL

## 2019-04-17 VITALS
HEIGHT: 66 IN | WEIGHT: 205 LBS | HEART RATE: 69 BPM | SYSTOLIC BLOOD PRESSURE: 127 MMHG | TEMPERATURE: 98 F | RESPIRATION RATE: 14 BRPM | DIASTOLIC BLOOD PRESSURE: 83 MMHG | OXYGEN SATURATION: 96 % | BODY MASS INDEX: 32.95 KG/M2

## 2019-04-17 VITALS
WEIGHT: 205 LBS | TEMPERATURE: 97 F | SYSTOLIC BLOOD PRESSURE: 122 MMHG | HEIGHT: 66 IN | DIASTOLIC BLOOD PRESSURE: 80 MMHG | RESPIRATION RATE: 18 BRPM | BODY MASS INDEX: 32.95 KG/M2

## 2019-04-17 DIAGNOSIS — I82.812 EMBOLISM FROM LEFT GREATER SAPHENOUS VEIN: Primary | ICD-10-CM

## 2019-04-17 DIAGNOSIS — M79.604 PAIN IN BOTH LOWER EXTREMITIES: ICD-10-CM

## 2019-04-17 DIAGNOSIS — L53.9 ERYTHEMA: ICD-10-CM

## 2019-04-17 DIAGNOSIS — M79.605 PAIN IN BOTH LOWER EXTREMITIES: ICD-10-CM

## 2019-04-17 PROBLEM — O09.523 ELDERLY MULTIGRAVIDA IN THIRD TRIMESTER: Status: RESOLVED | Noted: 2019-01-22 | Resolved: 2019-04-17

## 2019-04-17 PROBLEM — O09.90 HRP (HIGH RISK PREGNANCY): Status: RESOLVED | Noted: 2019-04-12 | Resolved: 2019-04-17

## 2019-04-17 PROCEDURE — 0503F POSTPARTUM CARE VISIT: CPT | Performed by: NURSE PRACTITIONER

## 2019-04-17 PROCEDURE — 93970 EXTREMITY STUDY: CPT

## 2019-04-17 PROCEDURE — 99283 EMERGENCY DEPT VISIT LOW MDM: CPT

## 2019-04-17 RX ORDER — CEPHALEXIN 500 MG/1
500 CAPSULE ORAL 4 TIMES DAILY
Qty: 40 CAPSULE | Refills: 0 | Status: SHIPPED | OUTPATIENT
Start: 2019-04-17 | End: 2019-04-27

## 2019-04-17 ASSESSMENT — PAIN SCALES - GENERAL
PAINLEVEL_OUTOF10: 3
PAINLEVEL_OUTOF10: 5

## 2019-04-17 ASSESSMENT — PAIN DESCRIPTION - LOCATION: LOCATION: LEG

## 2019-04-17 ASSESSMENT — PAIN DESCRIPTION - DESCRIPTORS: DESCRIPTORS: TENDER

## 2019-04-17 ASSESSMENT — PAIN DESCRIPTION - ORIENTATION: ORIENTATION: LEFT;UPPER

## 2019-04-17 NOTE — PROGRESS NOTES
Prashant Ada  2019  1:20 PM        Prashant Leesa is a 37 y.o. female       The patient was seen. She has no chief complaints today. She delivered vaginally on 2019. Complaining of bilateral calf tenderness- especially on the left calf. Patient also complaining of tenderness and redness to right upper thigh. Redness started in hospital on right thigh prior to discharge but since this time, area has increased in redness and is now very tender to touch. Denies fever. Temp today 97.0. Patient also complainiShe is  breast feeding and there is not any signs or symptoms of mastitis. The patient completed the E.P.D.S. Evaluation form and scored NA- to completed at 2 weeks. She does not have any signs or symptoms of post partum depression. She denies any suicidal thoughts with a plan, intent to harm others and delusional ideas. Today her lochia is light she denies any dizziness or shortness of breath. Her pregnancy was complicated by:  Patient Active Problem List    Diagnosis Date Noted    Migraine      Priority: Medium    Hx of abnormal Pap smear 2014     Priority: Low     Overview Note:       Change 16 weeks [facility]/S at [facility] to anatomy scan at 20 weeks      HRP (high risk pregnancy) 2019     19 M Apg 8/9 Wt 8#6 2019    Encounter for induction of labor     Elderly multigravida in third trimester 2019         She does admit to having good home support.       OB History    Para Term  AB Living   2 2 2 0 0 2   SAB TAB Ectopic Molar Multiple Live Births   0 0 0 0 0 2      # Outcome Date GA Lbr [place]/2nd Weight Sex Delivery Anes PTL Lv   2 Term 19 40w2d / 00:15 8 lb 6 oz (3.8 kg) M Vag-Spont EPI N OBED      Name: Clarence Das: 9  Apgar5: 9   1 Term 10/03/14 39w3d  7 lb 3 oz (3.26 kg) F Vag-Spont EPI  OBED       Patient Active Problem List   Diagnosis    Migraine    Hx of abnormal Pap smear    Elderly multigravida in third trimester     19 M Apg 8/9 Wt 8#6    Encounter for induction of labor    HRP (high risk pregnancy)       Blood pressure 122/80, temperature 97 °F (36.1 °C), resp. rate 18, height 5' 6\" (1.676 m), weight 205 lb (93 kg), unknown if currently breastfeeding. Abdomen: Soft and non-tender; good bowel sounds; no guarding, rebound or rigidity; no CVA tenderness bilaterally. Extremities:  DTR 2/4 bilaterally. 2 inch by 2 inch area of erythema noted to top of left thigh. No lumps palpated at this site or either calf area. No erythema noted to either bilateral extremity. Slight edema noted to bilateral lower ankles and feet, nonpitting. Perineum: intact    Assessment:   Diagnosis Orders   1. Postpartum follow-up  US DUP LOWER EXTREMITY LEFT KULWINDER    US DUP LOWER EXTREMITY RIGHT KULWINDER   2. Pain in both lower extremities  US DUP LOWER EXTREMITY LEFT KULWINDER    US DUP LOWER EXTREMITY RIGHT KULWINDER   3. Erythema  US DUP LOWER EXTREMITY LEFT KULWINDER    cephALEXin (KEFLEX) 500 MG capsule     Chief Complaint   Patient presents with    Postpartum Care    Leg Pain     Bilateral leg pain. Started 2019. Tender to touch and swollen. Pain comes and goes. Patient Active Problem List    Diagnosis Date Noted    Migraine      Priority: Medium    Hx of abnormal Pap smear 2014     Priority: Low       Change 16 weeks U/S at M to anatomy scan at 20 weeks      HRP (high risk pregnancy) 2019     19 M Apg 8/9 Wt 8#6 2019    Encounter for induction of labor     Elderly multigravida in third trimester 2019         EPDS Score of - to complete at 2 week PP visit        Plan:  1. Return to the office in  1 weeks  2. Signs & Symptoms of mastitis reviewed; notify if occurs  3. Secondary smoke risks reviewed. Increased risks of respiratory problems, Sudden  infant death syndrome, and potential malignancies. 4. Abstinence  5. Family planning counseling and STD counseling completed  6. Return in about 1 week (around 4/24/2019) for PP follow up. 7. Discussed with DR Amara Laboy patient's symptoms. Patient to go by wheelchair to have venous doppler scan bilaterally. Will wait for results. To rule out DVT/ phlebitis. Prescription for keflex sent to pharmacy. Instructed to alternate warm/cold compresses to left upper thigh. If no relief, or symptoms worsen, call office or go to ER.

## 2019-04-17 NOTE — ED PROVIDER NOTES
[address] [facility]  Emergency Department  Independent Attestation     Pt Name: Maya Ray  MRN: [id number]  [place] 1975  Date of evaluation: 4/17/19       Maya Ray is a 37 y.o. female who presents with Leg Swelling (Superficial blood clot in left upper leg confirmed by ultrasound)      I was personally available for consultation in the Emergency Department.     Carla Smith DO  Attending Emergency Physician  [address] [facility]      (Please note that portions of this note were completed with a voice recognition program.  Efforts were made to edit the dictations but occasionally words are mis-transcribed.)        Carla Smith DO  04/17/19 1414

## 2019-04-17 NOTE — ED PROVIDER NOTES
[address] [facility]  eMERGENCY dEPARTMENT eNCOUnter      Pt Name: Jyoti Beal  MRN: [id number]  [place] 1975  Date of evaluation: 4/17/2019  Provider: Gloria Colunga PA-C    [address] [place] [address]       Chief Complaint   Patient presents with    Leg Swelling     Superficial blood clot in left upper leg confirmed by ultrasound           HISTORY OF PRESENT ILLNESS  (Location/Symptom, Timing/Onset, Context/Setting, Quality, Duration, Modifying Factors, Severity.)   Jyoti Beal is a 37 y.o. female who presents to the emergency department with for evaluation of great saphenous blood clot. Pt is 6 days postpartum. Had a vaginal delivery. Pt reports on Friday when she was leaving the hospital she developed pain over her anterior left thigh and lateral leg. She also reports pain in her right leg but states left is worse. Pt reports having a varicose vein in this area and states that it is now very firm and tender. She denies any posterior leg pain or swelling. Pt did see her OBGYN today who ordered a bilateral venous doppler. Pt was found to have a clot in her left GSV from mid thigh to calf. She is denying chest pain, sob, cough, fevers, numbness. Pt is breast feeding. No other complaints. Nursing Notes were reviewed. REVIEW OF SYSTEMS    (2-9 systems for level 4, 10 or more for level 5)     Review of Systems   Blood clot   Leg pain,   Redness     Except as noted above the remainder of the review of systems was reviewed and negative.        PAST MEDICAL HISTORY     Past Medical History:   Diagnosis Date    Migraine     Narcolepsy      None otherwise stated in nurses notes    SURGICAL HISTORY       Past Surgical History:   Procedure Laterality Date    BREAST BIOPSY Left 12/2016     None otherwise stated in nurses notes    CURRENT MEDICATIONS       Discharge Medication List as of 4/17/2019  3:11 PM      CONTINUE these medications which have NOT CHANGED    Details   cephALEXin (KEFLEX) 500 MG capsule Take 1 capsule by mouth 4 times daily for 10 days, Disp-40 capsule, R-0Normal      ibuprofen (ADVIL;MOTRIN) 600 MG tablet Take 1 tablet by mouth every 6 hours as needed for Pain, Disp-60 tablet, R-1Print      docusate sodium (COLACE, DULCOLAX) 100 MG CAPS Take 100 mg by mouth 2 times daily as needed for Constipation, Disp-60 capsule, R-1Print      B Complex-C (SUPER B COMPLEX PO) Take 2 tablets by mouth dailyHistorical Med      sodium oxybate (XYREM) 500 MG/ML SOLN solution Take 4.5 g by mouth. .Historical Med             ALLERGIES     Latex    FAMILY HISTORY           Problem Relation Age of Onset    Heart Disease Father         a fib    High Cholesterol Father     Hypertension Father     High Cholesterol Mother     Diabetes Paternal Grandmother     Brain Cancer Maternal Grandmother     Diabetes Paternal Aunt     Liver Cancer Paternal Aunt     Breast Cancer Neg Hx     Cancer Neg Hx     Colon Cancer Neg Hx     Eclampsia Neg Hx     Ovarian Cancer Neg Hx      Labor Neg Hx     Spont Abortions Neg Hx     Stroke Neg Hx      Family Status   Relation Name Status    Father  Alive    Mother  Alive   [facility] Sister  [de-identified]    Sister  Alive    PGM  (Not Specified)    MGM  (Not Specified)    PAunt  (Not Specified)    Neg Hx  (Not Specified)      None otherwise stated in nurses notes    SOCIAL HISTORY      reports that she has never smoked. She has never used smokeless tobacco. She reports that she does not drink alcohol or use drugs. lives at home with others     PHYSICAL EXAM    (up to 7 for level 4, 8 or more for level 5)     ED Triage Vitals [19 1400]   BP Temp Temp src Pulse Resp SpO2 Height Weight   127/83 98 °F (36.7 °C) -- 69 14 96 % 5' 6\" (1.676 m) 205 lb (93 kg)       Physical Exam   Nursing note and vitals reviewed. Constitutional: Oriented to person, place, and time and well-developed, well-nourished. Head: Normocephalic and atraumatic.    Ear: External ears normal.   Nose: Nose normal and midline. Eyes: Conjunctivae and EOM are normal. Pupils are equal, round, and reactive to light. Neck: Normal range of motion. Neck supple. Throat: Posterior pharynx is without erythema or exudates, airway is patent, no swelling  Cardiovascular: Normal rate, regular rhythm, normal heart sounds and intact distal pulses. Pulmonary/Chest: Effort normal and breath sounds normal. No respiratory distress. No wheezes. No rales. No chest tenderness. Abdominal: Soft. Bowel sounds are normal. No distension and no mass. There is no tenderness. There is no rebound and no guarding. Musculoskeletal: examination of left lower extremity reveals tenderness over mid anterior thigh down to lateral side of knee. Pt posterior leg or calf pain. Varicose vein noted over anterior mid thigh and lateral knee. 2/2 dp and pt pulses. Distal sensation intact. Pt ambulatory. Neurological: Alert and oriented to person, place, and time. GCS score is 15. Skin: Skin is warm and dry. No rash noted. No erythema. No pallor. Psychiatric: Mood, memory, affect and judgment normal.           DIAGNOSTIC RESULTS     EKG: All EKG's are interpreted by the Emergency Department Physician who either signs or Co-signs this chart in the absence of a cardiologist.        RADIOLOGY:   All plain film, CT, MRI, and formal ultrasound images (except ED bedside ultrasound) are read by the radiologist, see reports below, unless otherwise noted in MDM or here. No orders to display           LABS:  Labs Reviewed - No data to display    All other labs were within normal range or not returned as of this dictation.     EMERGENCY DEPARTMENT COURSE and DIFFERENTIAL DIAGNOSIS/MDM:   Vitals:    Vitals:    04/17/19 1400   BP: 127/83   Pulse: 69   Resp: 14   Temp: 98 °F (36.7 °C)   SpO2: 96%   Weight: 205 lb (93 kg)   Height: 5' 6\" (1.676 m)         Patient instructed to return to the emergency room if symptoms worsen, return, or any other concern right away which is agreed by the patient    ED MEDS:  No orders of the defined types were placed in this encounter. CONSULTS:  IP CONSULT TO VASCULAR SURGERY    PROCEDURES:  None      FINAL IMPRESSION      1. Embolism from left greater saphenous vein          DISPOSITION/PLAN   DISPOSITION Decision To Discharge    PATIENT REFERRED TO:  Wes Painting MD  [address] [facility] [address] [facility] [address]  [phone]    Call       [facility] ED  [address]  [address] [address]  [phone]    If symptoms worsen    Petrona Mak [facility] [address], [address]  [address] [address]  [phone]    Call         DISCHARGE MEDICATIONS:  Discharge Medication List as of 4/17/2019  3:11 PM            Summation      Patient Course:    Pt sent over by OBGYN after venous doppler revealed  A non compressible GSV from mid thigh to mid calf. Pt is breast feeding. No cp, sob, cough. I did speak with [place], [place]. GSV clot is 6-8 inches from confluence of femoral vein. Dr. Blanca Garland consulted. Informed of doppler results. He would like pt started on ASA 325mg once daily. He would like her to get repeat doppler on Monday. OB resident [facility] consulted. States ASA is safe during breastfeeding. Steve Lin also educated patient. Pt informed of plan. She will call Dr. Terence Helm for doppler order. Strict return instructions discussed with patient including prompt return if she develops cp, sob, cough, or pain traveling toward groin or posterior aspect of leg. Discussed results and plan with the pt. They expressed appropriate understanding. Pt given close follow up, supportive care instructions and strict return instructions at the bedside.          ED Medications administered this visit:  Medications - No data to display    New Prescriptions from this visit:    Discharge Medication List as of 4/17/2019  3:11 PM          Follow-up:  Wes Painting MD  [address] [facility] [address]    Call       [address] ED  [address]  [address] [address] [phone]    If symptoms worsen    Petrona Mak, [facility] [address], [address] [place] [id number] [place] [address]  [phone]    Call           Final Impression:   1. Embolism from left greater saphenous vein               (Please note that portions of this note were completed with a voice recognition program.  Efforts were made to edit the dictations but occasionally words are mis-transcribed.)      (Please note that portions of this note were completed with a voice recognition program.  Efforts were made to edit the dictations but occasionally words are mis-transcribed.)    Gloria Colunga, [address] [place], [place]-  04/17/19 5247

## 2019-04-18 DIAGNOSIS — M79.605 PAIN IN BOTH LOWER EXTREMITIES: Primary | ICD-10-CM

## 2019-04-18 DIAGNOSIS — M79.604 PAIN IN BOTH LOWER EXTREMITIES: Primary | ICD-10-CM

## 2019-04-22 ENCOUNTER — HOSPITAL ENCOUNTER (OUTPATIENT)
Dept: VASCULAR LAB | Age: 44
Discharge: HOME | End: 2019-04-22
Payer: COMMERCIAL

## 2019-04-22 DIAGNOSIS — M79.605 PAIN IN BOTH LOWER EXTREMITIES: ICD-10-CM

## 2019-04-22 DIAGNOSIS — M79.604 PAIN IN BOTH LOWER EXTREMITIES: ICD-10-CM

## 2019-04-22 PROCEDURE — 93970 EXTREMITY STUDY: CPT

## 2019-04-24 ENCOUNTER — TELEPHONE (OUTPATIENT)
Dept: OBGYN CLINIC | Age: 44
End: 2019-04-24

## 2019-04-24 DIAGNOSIS — M79.605 PAIN IN BOTH LOWER EXTREMITIES: Primary | ICD-10-CM

## 2019-04-24 DIAGNOSIS — M79.604 PAIN IN BOTH LOWER EXTREMITIES: Primary | ICD-10-CM

## 2019-04-24 NOTE — TELEPHONE ENCOUNTER
LM for pt to call office regarding venous doppler results. Pt will need referral to Dr Amarilys Brown and PCP regarding thrombose varicosities.

## 2019-04-24 NOTE — TELEPHONE ENCOUNTER
----- Message from Farhat Chisholm, Sadie Rivas sent at 4/23/2019  8:03 AM EDT -----  Please have Dr Fuller Bring review

## 2019-04-24 NOTE — TELEPHONE ENCOUNTER
----- Message from Jozef Corby, [address] sent at 4/23/2019  8:03 AM EDT -----  Please have Dr Amber Caldwell review

## 2019-04-25 ENCOUNTER — OFFICE VISIT (OUTPATIENT)
Dept: OBGYN CLINIC | Age: 44
End: 2019-04-25

## 2019-04-25 VITALS
WEIGHT: 202 LBS | RESPIRATION RATE: 18 BRPM | HEIGHT: 66 IN | SYSTOLIC BLOOD PRESSURE: 120 MMHG | DIASTOLIC BLOOD PRESSURE: 78 MMHG | BODY MASS INDEX: 32.47 KG/M2

## 2019-04-25 PROCEDURE — 0503F POSTPARTUM CARE VISIT: CPT | Performed by: NURSE PRACTITIONER

## 2019-04-25 NOTE — PROGRESS NOTES
Satish Dawson  2019  6:41 PM        Satish Dawson is a 37 y.o. female       The patient was seen. She has no chief complaints today. She delivered vaginally on 2019. She is  breast feeding and there is not any signs or symptoms of mastitis. The patient completed the E.P.D.S. Evaluation form and scored 6. Hx of greater saphenous vein clot in left leg- by doppler scan. Patient currently on 325mg PO ASA daily. Patient reports symptoms are much improved since last week. Denies pain to left leg, denies redness or warm to touch. Patient has consult scheduled with vascular surgeon, DR Aura Cole. She does not have any signs or symptoms of post partum depression. She denies any suicidal thoughts with a plan, intent to harm others and delusional ideas. Today her lochia is light she denies any dizziness or shortness of breath. Her pregnancy was complicated by:  Patient Active Problem List    Diagnosis Date Noted    Migraine      Priority: Medium    Hx of abnormal Pap smear 2014     Priority: Low     Overview Note:       Change 16 weeks U/S at [facility] to anatomy scan at 20 weeks       19 M Apg 8/9 Wt 8#6 2019         She does admit to having good home support. OB History    Para Term  AB Living   2 2 2 0 0 2   SAB TAB Ectopic Molar Multiple Live Births   0 0 0 0 0 2      # Outcome Date GA Lbr [place]/2nd Weight Sex Delivery Anes PTL Lv   2 Term 19 40w2d / 00:15 8 lb 6 oz (3.8 kg) M Vag-Spont EPI N OBED      Name: Chandan Landin  Apgar5: 9   1 Term 10/03/14 39w3d  7 lb 3 oz (3.26 kg) F Vag-Spont EPI  OBED       Patient Active Problem List   Diagnosis    Migraine    Hx of abnormal Pap smear     19 M Apg 8/9 Wt 8#6       Blood pressure 120/78, resp. rate 18, height 5' 6\" (1.676 m), weight 202 lb (91.6 kg), currently breastfeeding.     Abdomen: Soft and non-tender; good bowel sounds; no guarding, rebound or rigidity; no CVA tenderness bilaterally. Extremities: No calf tenderness bilaterally. DTR 2/4 bilaterally. No edema. Perineum: intact    Assessment:   Diagnosis Orders   1. Routine postpartum follow-up     2.  (spontaneous vaginal delivery)       Chief Complaint   Patient presents with    Postpartum Care     Patient Active Problem List    Diagnosis Date Noted    Migraine      Priority: Medium    Hx of abnormal Pap smear 2014     Priority: Low       Change 16 weeks U/S at [facility] to anatomy scan at 20 weeks       19 M Apg 8/9 Wt 8#6 2019         EPDS Score of 6        Plan:  1. Return to the office in  3-4 weeks  2. Signs & Symptoms of mastitis reviewed; notify if occurs  3. Secondary smoke risks reviewed. Increased risks of respiratory problems, Sudden     infant death syndrome, and potential malignancies. 4. Abstinence  5. Family planning counseling and STD counseling completed  6. Return in about 1 month (around 2019) for 6 week PP visit. 7. Continue on daily ASA as previously prescribed. Go to ER with chest pain, SOB, cough or pain to legs. Keep follow up with DR Aguilar.

## 2019-05-23 ENCOUNTER — OFFICE VISIT (OUTPATIENT)
Dept: OBGYN CLINIC | Age: 44
End: 2019-05-23

## 2019-05-23 VITALS
SYSTOLIC BLOOD PRESSURE: 112 MMHG | HEART RATE: 59 BPM | BODY MASS INDEX: 32.49 KG/M2 | WEIGHT: 207 LBS | HEIGHT: 67 IN | DIASTOLIC BLOOD PRESSURE: 70 MMHG

## 2019-05-23 PROCEDURE — 0503F POSTPARTUM CARE VISIT: CPT | Performed by: NURSE PRACTITIONER

## 2019-05-23 NOTE — PROGRESS NOTES
6 week Postpartum Visit  See 6 week PP Questionnaire  Follow up on 9/2019 for Annual exam/Pap smear  Patient currently on Coumadin for left leg DVT- managed by DR Aguilar. Has follow up with Dr Tricia Olea scheduled Ashia 3, 2019.  planning on getting vasectomy.

## 2019-09-27 ENCOUNTER — OFFICE VISIT (OUTPATIENT)
Dept: FAMILY MEDICINE CLINIC | Age: 44
End: 2019-09-27

## 2019-09-27 VITALS
HEART RATE: 63 BPM | DIASTOLIC BLOOD PRESSURE: 85 MMHG | OXYGEN SATURATION: 99 % | TEMPERATURE: 97.9 F | SYSTOLIC BLOOD PRESSURE: 123 MMHG
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
## 2019-09-27 PROCEDURE — 99202 OFFICE O/P NEW SF 15 MIN: CPT | Performed by: NURSE PRACTITIONER

## 2019-09-27 RX ORDER — AMOXICILLIN AND CLAVULANATE POTASSIUM 875; 125 MG/1; MG/1
1 TABLET, FILM COATED ORAL 2 TIMES DAILY
Qty: 20 TABLET | Refills: 0 | Status: SHIPPED | OUTPATIENT
Start: 2019-09-27 | End: 2019-10-07

## 2019-09-27 ASSESSMENT — PATIENT HEALTH QUESTIONNAIRE - PHQ9
SUM OF ALL RESPONSES TO PHQ9 QUESTIONS 1 & 2: 0
SUM OF ALL RESPONSES TO PHQ QUESTIONS 1-9: 0
SUM OF ALL RESPONSES TO PHQ QUESTIONS 1-9: 0
1. LITTLE INTEREST OR PLEASURE IN DOING THINGS: 0
2. FEELING DOWN, DEPRESSED OR HOPELESS: 0

## 2019-09-27 NOTE — PROGRESS NOTES
puncture distal rt index finger volar aspect  No swelling or drainage  Full rom noted  nvi   Neurological: She is alert and oriented to person, place, and time. No cranial nerve deficit. Skin: Skin is warm and dry. Capillary refill takes less than 2 seconds. No rash noted. She is not diaphoretic. See above   Psychiatric: She has a normal mood and affect. Her behavior is normal.   Nursing note and vitals reviewed. /85 (Site: Left Upper Arm, Position: Sitting, Cuff Size: Large Adult)   Pulse 63   Temp 97.9 °F (36.6 °C) (Oral)   SpO2 99%     Assessment:          Diagnosis Orders   1. Cat bite, initial encounter  amoxicillin-clavulanate (AUGMENTIN) 875-125 MG per tablet       Plan:      Warm soapy soaks three times a day  Motrin and tylenol as directed  Bacitracin, bandaid  augmentin as directed= there is some excretion in breast milk, check with ob gyn  Tetanus was already up to date  Recheck asap or go to er for increased redness, fever, swelling, not able to move finger or drainage  Follow up asap for recheck needs recheck by Monday  Cat bites can become very infected and can become serious  Animal bite form was completed         Patient given educational materials - see patientinstructions. Discussed use, benefit, and side effects of prescribed medications. All patient questions answered. Pt voiced understanding.     Electronically signed by RUCHI Meyers 9/27/2019 at 6:59 PM

## 2019-09-27 NOTE — PATIENT INSTRUCTIONS
Warm soapy soaks three times a day  Motrin and tylenol as directed  Bacitracin, bandaid  augmentin as directed= there is some excretion in breast milk, check with ob gyn  Tetanus was already up to date  Recheck asap or go to er for increased redness, fever, swelling, not able to move finger or drainage  Follow up asap for recheck needs recheck by Monday  Cat bites can become very infected and can become serious  Patient Education        Animal Bites: Care Instructions  Your Care Instructions  After an animal bite, the biggest concern is infection. The chance of infection depends on the type of animal that bit you, where on your body you were bitten, and your general health. Many animal bites are not closed with stitches, because this can increase the chance of infection. Your bite may take as little as 7 days or as long as several months to heal, depending on how bad it is. Taking good care of your wound at home will help it heal and reduce your chance of infection. The doctor has checked you carefully, but problems can develop later. If you notice any problems or new symptoms, get medical treatment right away. Follow-up care is a key part of your treatment and safety. Be sure to make and go to all appointments, and call your doctor if you are having problems. It's also a good idea to know your test results and keep a list of the medicines you take. How can you care for yourself at home? · If your doctor told you how to care for your wound, follow your doctor's instructions. If you did not get instructions, follow this general advice:  ? After 24 to 48 hours, gently wash the wound with clean water 2 times a day. Do not scrub or soak the wound. Don't use hydrogen peroxide or alcohol, which can slow healing. ? You may cover the wound with a thin layer of petroleum jelly, such as Vaseline, and a nonstick bandage. ? Apply more petroleum jelly and replace the bandage as needed.   · After you shower, gently dry the

## 2020-05-21 ENCOUNTER — HOSPITAL ENCOUNTER (OUTPATIENT)
Age: 45
Setting detail: SPECIMEN
Discharge: HOME | End: 2020-05-21
Payer: COMMERCIAL

## 2020-05-21 ENCOUNTER — OFFICE VISIT (OUTPATIENT)
Dept: OBGYN CLINIC | Age: 45
End: 2020-05-21
Payer: COMMERCIAL

## 2020-05-21 VITALS
TEMPERATURE: 97.6 F | WEIGHT: 208 LBS | HEIGHT: 67 IN | SYSTOLIC BLOOD PRESSURE: 116 MMHG | BODY MASS INDEX: 32.65 KG/M2 | HEART RATE: 90 BPM | DIASTOLIC BLOOD PRESSURE: 72 MMHG | RESPIRATION RATE: 18 BRPM

## 2020-05-21 PROBLEM — Z86.718 HX OF DEEP VENOUS THROMBOSIS: Status: ACTIVE | Noted: 2020-05-21

## 2020-05-21 PROCEDURE — G0145 SCR C/V CYTO,THINLAYER,RESCR: HCPCS

## 2020-05-21 PROCEDURE — 87624 HPV HI-RISK TYP POOLED RSLT: CPT

## 2020-05-21 PROCEDURE — 99396 PREV VISIT EST AGE 40-64: CPT | Performed by: NURSE PRACTITIONER

## 2020-05-21 ASSESSMENT — PATIENT HEALTH QUESTIONNAIRE - PHQ9
SUM OF ALL RESPONSES TO PHQ QUESTIONS 1-9: 0
1. LITTLE INTEREST OR PLEASURE IN DOING THINGS: 0
SUM OF ALL RESPONSES TO PHQ9 QUESTIONS 1 & 2: 0
SUM OF ALL RESPONSES TO PHQ QUESTIONS 1-9: 0
2. FEELING DOWN, DEPRESSED OR HOPELESS: 0

## 2020-05-21 NOTE — PROGRESS NOTES
ROS: No CVA, Migraines, Epilepsy, Seizure Hx, or Limb Weakness  Dermatological ROS: No Rash, Itching, Hives, Mole Changes or Cancer                                                                                                                                                                                                                                  PHYSICAL Exam:     Constitutional:  There were no vitals filed for this visit. General Appearance: This  is a well Developed, well Nourished, well groomed female. Her BMI was reviewed. Nutritional decision making was discussed. Skin:  There was a Normal Inspection of the skin without rashes or lesions. There were no rashes. (Papular, Maculopapular, Hives, Pustular, Macular)     There were no lesions (Ulcers, Erythema, Abn. Appearing Nevi)            Lymphatic:  No Lymph Nodes were Palpable in the neck , axilla or groin.  0 # Of Lymph Nodes; Location ; Character [Normal]  [Shotty] [Tender] [Enlarged]     Neck and EENT:  The neck was supple. There were no masses   The thyroid was not enlarged and had no masses. Perrla, EOMI B/L, TMI B/L No Abnormalities. Throat inspected-No exudates or Masses, Nares Patent No Masses        Respiratory: The lungs were auscultated and found to be clear. There were no rales, rhonchi or wheezes. There was a good respiratory effort. Cardiovascular: The heart was in a regular rate and rhythm. . No S3 or S4. There was no murmur appreciated. Location, grade, and radiation are not applicable. Extremities: The patients extremities were without calf tenderness, edema, or varicosities. There was full range of motion in all four extremities. Pulses in all four extremities were appreciated and are 2/4. Abdomen: The abdomen was soft and non-tender. There were good bowel sounds in all quadrants and there was no guarding, rebound or rigidity.   On evaluation there was no evidence of hepatosplenomegaly and there was no

## 2020-05-22 LAB
HPV SAMPLE: NORMAL
HPV, GENOTYPE 16: NOT DETECTED
HPV, GENOTYPE 18: NOT DETECTED
HPV, HIGH RISK OTHER: NOT DETECTED
HPV, INTERPRETATION: NORMAL
SPECIMEN DESCRIPTION: NORMAL

## 2020-05-29 LAB — CYTOLOGY REPORT: NORMAL

## 2021-10-13 ENCOUNTER — HOSPITAL ENCOUNTER (OUTPATIENT)
Age: 46
Setting detail: SPECIMEN
Discharge: HOME | End: 2021-10-13
Payer: COMMERCIAL

## 2021-10-14 LAB
CHLAMYDIA BY THIN PREP: NEGATIVE
N. GONORRHOEAE DNA, THIN PREP: NEGATIVE
SPECIMEN DESCRIPTION: NORMAL

## 2021-10-19 LAB — CYTOLOGY REPORT: NORMAL
